# Patient Record
Sex: MALE | Race: WHITE | NOT HISPANIC OR LATINO | Employment: FULL TIME | URBAN - METROPOLITAN AREA
[De-identification: names, ages, dates, MRNs, and addresses within clinical notes are randomized per-mention and may not be internally consistent; named-entity substitution may affect disease eponyms.]

---

## 2023-03-07 LAB
ALANINE AMINOTRANSFERASE (SGPT) (U/L) IN SER/PLAS: 22 U/L (ref 10–52)
ALBUMIN (G/DL) IN SER/PLAS: 4.2 G/DL (ref 3.4–5)
ALKALINE PHOSPHATASE (U/L) IN SER/PLAS: 35 U/L (ref 33–136)
ANION GAP IN SER/PLAS: 13 MMOL/L (ref 10–20)
ASPARTATE AMINOTRANSFERASE (SGOT) (U/L) IN SER/PLAS: 23 U/L (ref 9–39)
BILIRUBIN TOTAL (MG/DL) IN SER/PLAS: 0.9 MG/DL (ref 0–1.2)
CALCIUM (MG/DL) IN SER/PLAS: 9.2 MG/DL (ref 8.6–10.6)
CARBON DIOXIDE, TOTAL (MMOL/L) IN SER/PLAS: 35 MMOL/L (ref 21–32)
CHLORIDE (MMOL/L) IN SER/PLAS: 99 MMOL/L (ref 98–107)
CHOLESTEROL (MG/DL) IN SER/PLAS: 139 MG/DL (ref 0–199)
CHOLESTEROL IN HDL (MG/DL) IN SER/PLAS: 47.6 MG/DL
CHOLESTEROL/HDL RATIO: 2.9
CREATININE (MG/DL) IN SER/PLAS: 0.88 MG/DL (ref 0.5–1.3)
ERYTHROCYTE DISTRIBUTION WIDTH (RATIO) BY AUTOMATED COUNT: 14.6 % (ref 11.5–14.5)
ERYTHROCYTE MEAN CORPUSCULAR HEMOGLOBIN CONCENTRATION (G/DL) BY AUTOMATED: 31.6 G/DL (ref 32–36)
ERYTHROCYTE MEAN CORPUSCULAR VOLUME (FL) BY AUTOMATED COUNT: 99 FL (ref 80–100)
ERYTHROCYTES (10*6/UL) IN BLOOD BY AUTOMATED COUNT: 5.65 X10E12/L (ref 4.5–5.9)
GFR MALE: >90 ML/MIN/1.73M2
GLUCOSE (MG/DL) IN SER/PLAS: 99 MG/DL (ref 74–99)
HEMATOCRIT (%) IN BLOOD BY AUTOMATED COUNT: 56 % (ref 41–52)
HEMOGLOBIN (G/DL) IN BLOOD: 17.7 G/DL (ref 13.5–17.5)
LDL: 74 MG/DL (ref 0–99)
LEUKOCYTES (10*3/UL) IN BLOOD BY AUTOMATED COUNT: 8.3 X10E9/L (ref 4.4–11.3)
NRBC (PER 100 WBCS) BY AUTOMATED COUNT: 0 /100 WBC (ref 0–0)
PLATELETS (10*3/UL) IN BLOOD AUTOMATED COUNT: 131 X10E9/L (ref 150–450)
POTASSIUM (MMOL/L) IN SER/PLAS: 4.3 MMOL/L (ref 3.5–5.3)
PROTEIN TOTAL: 6.9 G/DL (ref 6.4–8.2)
SODIUM (MMOL/L) IN SER/PLAS: 143 MMOL/L (ref 136–145)
TRIGLYCERIDE (MG/DL) IN SER/PLAS: 89 MG/DL (ref 0–149)
UREA NITROGEN (MG/DL) IN SER/PLAS: 25 MG/DL (ref 6–23)
VLDL: 18 MG/DL (ref 0–40)

## 2023-03-16 PROBLEM — I25.10 ASHD (ARTERIOSCLEROTIC HEART DISEASE): Status: ACTIVE | Noted: 2023-03-16

## 2023-03-16 PROBLEM — R51.9 HEADACHE: Status: ACTIVE | Noted: 2023-03-16

## 2023-03-16 PROBLEM — E78.5 HYPERLIPIDEMIA: Status: ACTIVE | Noted: 2023-03-16

## 2023-03-16 PROBLEM — D23.71 DERMATOFIBROMA OF RIGHT CALF: Status: ACTIVE | Noted: 2023-03-16

## 2023-03-16 PROBLEM — J30.0 VASOMOTOR RHINITIS: Status: ACTIVE | Noted: 2023-03-16

## 2023-03-16 PROBLEM — I10 BENIGN ESSENTIAL HYPERTENSION: Status: ACTIVE | Noted: 2023-03-16

## 2023-03-16 PROBLEM — R73.9 BLOOD GLUCOSE ELEVATED: Status: ACTIVE | Noted: 2023-03-16

## 2023-03-16 PROBLEM — R10.9 ABDOMINAL PAIN: Status: ACTIVE | Noted: 2023-03-16

## 2023-03-16 PROBLEM — R06.02 SHORTNESS OF BREATH: Status: ACTIVE | Noted: 2023-03-16

## 2023-03-16 PROBLEM — Z86.79 PERSONAL HISTORY OF ATRIAL FIBRILLATION: Status: ACTIVE | Noted: 2023-03-16

## 2023-03-16 PROBLEM — S46.212A TEAR OF LEFT BICEPS MUSCLE: Status: ACTIVE | Noted: 2023-03-16

## 2023-03-16 PROBLEM — R05.9 COUGH: Status: ACTIVE | Noted: 2023-03-16

## 2023-03-16 PROBLEM — R21 SKIN RASH: Status: ACTIVE | Noted: 2023-03-16

## 2023-03-16 PROBLEM — E55.9 MILD VITAMIN D DEFICIENCY: Status: ACTIVE | Noted: 2023-03-16

## 2023-03-16 PROBLEM — G47.33 OBSTRUCTIVE SLEEP APNEA, ADULT: Status: ACTIVE | Noted: 2023-03-16

## 2023-03-16 PROBLEM — I48.91 ATRIAL FIBRILLATION (MULTI): Status: ACTIVE | Noted: 2023-03-16

## 2023-03-16 PROBLEM — J45.20: Status: ACTIVE | Noted: 2023-03-16

## 2023-03-16 PROBLEM — I35.0 AORTIC STENOSIS: Status: ACTIVE | Noted: 2023-03-16

## 2023-03-16 PROBLEM — J30.1 SEASONAL ALLERGIC RHINITIS DUE TO POLLEN: Status: ACTIVE | Noted: 2023-03-16

## 2023-03-16 PROBLEM — H54.62 VISION LOSS, LEFT EYE: Status: ACTIVE | Noted: 2023-03-16

## 2023-03-16 PROBLEM — Z95.2 S/P TAVR (TRANSCATHETER AORTIC VALVE REPLACEMENT): Status: ACTIVE | Noted: 2023-03-16

## 2023-03-16 PROBLEM — R31.9 HEMATURIA: Status: ACTIVE | Noted: 2023-03-16

## 2023-03-16 PROBLEM — E66.01 MORBID OBESITY WITH BMI OF 50.0-59.9, ADULT (MULTI): Status: ACTIVE | Noted: 2023-03-16

## 2023-03-16 PROBLEM — E66.01 MORBID OBESITY (MULTI): Status: ACTIVE | Noted: 2023-03-16

## 2023-03-16 PROBLEM — R53.83 FATIGUE: Status: ACTIVE | Noted: 2023-03-16

## 2023-03-16 PROBLEM — I89.0 CHRONIC ACQUIRED LYMPHEDEMA: Status: ACTIVE | Noted: 2023-03-16

## 2023-03-16 PROBLEM — R35.0 URINE FREQUENCY: Status: ACTIVE | Noted: 2023-03-16

## 2023-03-16 PROBLEM — D75.1 POLYCYTHEMIA: Status: ACTIVE | Noted: 2023-03-16

## 2023-03-16 PROBLEM — I63.9 STROKE (MULTI): Status: ACTIVE | Noted: 2023-03-16

## 2023-03-16 PROBLEM — R60.9 DEPENDENT EDEMA: Status: ACTIVE | Noted: 2023-03-16

## 2023-03-16 RX ORDER — MULTIVITAMIN
1 TABLET ORAL DAILY
COMMUNITY
Start: 2017-06-01

## 2023-03-16 RX ORDER — ALBUTEROL SULFATE 90 UG/1
1-2 AEROSOL, METERED RESPIRATORY (INHALATION)
COMMUNITY
Start: 2021-12-13 | End: 2023-09-25 | Stop reason: ALTCHOICE

## 2023-03-16 RX ORDER — WARFARIN SODIUM 5 MG/1
TABLET ORAL
COMMUNITY
Start: 2020-02-24

## 2023-03-16 RX ORDER — CALCIUM CARBONATE 300MG(750)
TABLET,CHEWABLE ORAL
COMMUNITY
Start: 2017-06-01

## 2023-03-16 RX ORDER — GLUCOSAMINE HCL 500 MG
TABLET ORAL
COMMUNITY
Start: 2017-03-02

## 2023-03-16 RX ORDER — ROSUVASTATIN CALCIUM 20 MG/1
1 TABLET, COATED ORAL NIGHTLY
COMMUNITY
Start: 2019-06-06

## 2023-03-16 RX ORDER — ATENOLOL 25 MG/1
1 TABLET ORAL DAILY
COMMUNITY
Start: 2015-11-19

## 2023-03-16 RX ORDER — ALBUTEROL SULFATE 90 UG/1
2 AEROSOL, METERED RESPIRATORY (INHALATION) EVERY 4 HOURS PRN
COMMUNITY
Start: 2022-07-25

## 2023-03-16 RX ORDER — FUROSEMIDE 20 MG/1
1 TABLET ORAL DAILY
COMMUNITY
Start: 2022-09-27 | End: 2023-03-17 | Stop reason: SDUPTHER

## 2023-03-17 ENCOUNTER — OFFICE VISIT (OUTPATIENT)
Dept: PRIMARY CARE | Facility: CLINIC | Age: 63
End: 2023-03-17
Payer: COMMERCIAL

## 2023-03-17 VITALS
BODY MASS INDEX: 42.66 KG/M2 | SYSTOLIC BLOOD PRESSURE: 142 MMHG | WEIGHT: 315 LBS | HEIGHT: 72 IN | DIASTOLIC BLOOD PRESSURE: 76 MMHG

## 2023-03-17 DIAGNOSIS — I50.9 CONGESTIVE HEART FAILURE, UNSPECIFIED HF CHRONICITY, UNSPECIFIED HEART FAILURE TYPE (MULTI): ICD-10-CM

## 2023-03-17 DIAGNOSIS — R60.9 EDEMA, UNSPECIFIED TYPE: ICD-10-CM

## 2023-03-17 DIAGNOSIS — I10 BENIGN ESSENTIAL HYPERTENSION: ICD-10-CM

## 2023-03-17 DIAGNOSIS — E66.01 MORBID OBESITY WITH BMI OF 50.0-59.9, ADULT (MULTI): Primary | ICD-10-CM

## 2023-03-17 DIAGNOSIS — I48.11 LONGSTANDING PERSISTENT ATRIAL FIBRILLATION (MULTI): ICD-10-CM

## 2023-03-17 DIAGNOSIS — Z12.5 ENCOUNTER FOR PROSTATE CANCER SCREENING: ICD-10-CM

## 2023-03-17 DIAGNOSIS — E78.5 HYPERLIPIDEMIA, UNSPECIFIED HYPERLIPIDEMIA TYPE: ICD-10-CM

## 2023-03-17 PROCEDURE — 3077F SYST BP >= 140 MM HG: CPT | Performed by: INTERNAL MEDICINE

## 2023-03-17 PROCEDURE — 99214 OFFICE O/P EST MOD 30 MIN: CPT | Performed by: INTERNAL MEDICINE

## 2023-03-17 PROCEDURE — 3078F DIAST BP <80 MM HG: CPT | Performed by: INTERNAL MEDICINE

## 2023-03-17 PROCEDURE — 3008F BODY MASS INDEX DOCD: CPT | Performed by: INTERNAL MEDICINE

## 2023-03-17 RX ORDER — FUROSEMIDE 20 MG/1
20 TABLET ORAL DAILY
Qty: 90 TABLET | Refills: 1 | Status: SHIPPED | OUTPATIENT
Start: 2023-03-17 | End: 2023-09-26 | Stop reason: DRUGHIGH
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
## 2023-05-02 ENCOUNTER — OFFICE VISIT (OUTPATIENT)
Dept: PRIMARY CARE | Facility: CLINIC | Age: 63
End: 2023-05-02
Payer: COMMERCIAL

## 2023-05-02 VITALS
DIASTOLIC BLOOD PRESSURE: 86 MMHG | BODY MASS INDEX: 42.66 KG/M2 | WEIGHT: 315 LBS | HEIGHT: 72 IN | SYSTOLIC BLOOD PRESSURE: 144 MMHG

## 2023-05-02 DIAGNOSIS — R06.02 SHORTNESS OF BREATH: Primary | ICD-10-CM

## 2023-05-02 DIAGNOSIS — I48.0 PAROXYSMAL ATRIAL FIBRILLATION (MULTI): ICD-10-CM

## 2023-05-02 DIAGNOSIS — R05.1 ACUTE COUGH: ICD-10-CM

## 2023-05-02 DIAGNOSIS — Z86.79 PERSONAL HISTORY OF ATRIAL FIBRILLATION: ICD-10-CM

## 2023-05-02 PROBLEM — J06.9 UPPER RESPIRATORY TRACT INFECTION: Status: ACTIVE | Noted: 2020-01-06

## 2023-05-02 PROBLEM — Z95.818 PRESENCE OF WATCHMAN LEFT ATRIAL APPENDAGE CLOSURE DEVICE: Status: ACTIVE | Noted: 2020-02-11

## 2023-05-02 PROCEDURE — 93000 ELECTROCARDIOGRAM COMPLETE: CPT | Performed by: INTERNAL MEDICINE

## 2023-05-02 PROCEDURE — 3077F SYST BP >= 140 MM HG: CPT | Performed by: INTERNAL MEDICINE

## 2023-05-02 PROCEDURE — 3008F BODY MASS INDEX DOCD: CPT | Performed by: INTERNAL MEDICINE

## 2023-05-02 PROCEDURE — 3079F DIAST BP 80-89 MM HG: CPT | Performed by: INTERNAL MEDICINE

## 2023-05-02 PROCEDURE — 99214 OFFICE O/P EST MOD 30 MIN: CPT | Performed by: INTERNAL MEDICINE

## 2023-05-02 RX ORDER — AZITHROMYCIN 250 MG/1
TABLET, FILM COATED ORAL
Qty: 6 TABLET | Refills: 0 | Status: SHIPPED | OUTPATIENT
Start: 2023-05-02 | End: 2023-05-07
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
## 2023-05-04 ENCOUNTER — LAB (OUTPATIENT)
Dept: LAB | Facility: LAB | Age: 63
End: 2023-05-04
Payer: COMMERCIAL

## 2023-05-04 DIAGNOSIS — I10 BENIGN ESSENTIAL HYPERTENSION: ICD-10-CM

## 2023-05-04 DIAGNOSIS — R06.02 SHORTNESS OF BREATH: ICD-10-CM

## 2023-05-04 DIAGNOSIS — Z12.5 ENCOUNTER FOR PROSTATE CANCER SCREENING: ICD-10-CM

## 2023-05-04 DIAGNOSIS — E78.5 HYPERLIPIDEMIA, UNSPECIFIED HYPERLIPIDEMIA TYPE: ICD-10-CM

## 2023-05-04 DIAGNOSIS — I48.11 LONGSTANDING PERSISTENT ATRIAL FIBRILLATION (MULTI): ICD-10-CM

## 2023-05-04 LAB
ALANINE AMINOTRANSFERASE (SGPT) (U/L) IN SER/PLAS: 23 U/L (ref 10–52)
ALBUMIN (G/DL) IN SER/PLAS: 4.2 G/DL (ref 3.4–5)
ALKALINE PHOSPHATASE (U/L) IN SER/PLAS: 37 U/L (ref 33–136)
ANION GAP IN SER/PLAS: 8 MMOL/L (ref 10–20)
ASPARTATE AMINOTRANSFERASE (SGOT) (U/L) IN SER/PLAS: 24 U/L (ref 9–39)
BILIRUBIN TOTAL (MG/DL) IN SER/PLAS: 0.9 MG/DL (ref 0–1.2)
CALCIUM (MG/DL) IN SER/PLAS: 9.3 MG/DL (ref 8.6–10.6)
CARBON DIOXIDE, TOTAL (MMOL/L) IN SER/PLAS: 38 MMOL/L (ref 21–32)
CHLORIDE (MMOL/L) IN SER/PLAS: 101 MMOL/L (ref 98–107)
CHOLESTEROL (MG/DL) IN SER/PLAS: 118 MG/DL (ref 0–199)
CHOLESTEROL IN HDL (MG/DL) IN SER/PLAS: 48.4 MG/DL
CHOLESTEROL/HDL RATIO: 2.4
CREATININE (MG/DL) IN SER/PLAS: 1 MG/DL (ref 0.5–1.3)
ERYTHROCYTE DISTRIBUTION WIDTH (RATIO) BY AUTOMATED COUNT: 14.8 % (ref 11.5–14.5)
ERYTHROCYTE MEAN CORPUSCULAR HEMOGLOBIN CONCENTRATION (G/DL) BY AUTOMATED: 30.6 G/DL (ref 32–36)
ERYTHROCYTE MEAN CORPUSCULAR VOLUME (FL) BY AUTOMATED COUNT: 101 FL (ref 80–100)
ERYTHROCYTES (10*6/UL) IN BLOOD BY AUTOMATED COUNT: 5.62 X10E12/L (ref 4.5–5.9)
GFR MALE: 85 ML/MIN/1.73M2
GLUCOSE (MG/DL) IN SER/PLAS: 106 MG/DL (ref 74–99)
HEMATOCRIT (%) IN BLOOD BY AUTOMATED COUNT: 56.5 % (ref 41–52)
HEMOGLOBIN (G/DL) IN BLOOD: 17.3 G/DL (ref 13.5–17.5)
LDL: 50 MG/DL (ref 0–99)
LEUKOCYTES (10*3/UL) IN BLOOD BY AUTOMATED COUNT: 8.8 X10E9/L (ref 4.4–11.3)
NATRIURETIC PEPTIDE B (PG/ML) IN SER/PLAS: 64 PG/ML (ref 0–99)
NRBC (PER 100 WBCS) BY AUTOMATED COUNT: 0 /100 WBC (ref 0–0)
PLATELETS (10*3/UL) IN BLOOD AUTOMATED COUNT: 139 X10E9/L (ref 150–450)
POTASSIUM (MMOL/L) IN SER/PLAS: 4.4 MMOL/L (ref 3.5–5.3)
PROSTATE SPECIFIC AG (NG/ML) IN SER/PLAS: 3.06 NG/ML (ref 0–4)
PROTEIN TOTAL: 7 G/DL (ref 6.4–8.2)
SODIUM (MMOL/L) IN SER/PLAS: 143 MMOL/L (ref 136–145)
THYROTROPIN (MIU/L) IN SER/PLAS BY DETECTION LIMIT <= 0.05 MIU/L: 2.46 MIU/L (ref 0.44–3.98)
TRIGLYCERIDE (MG/DL) IN SER/PLAS: 98 MG/DL (ref 0–149)
UREA NITROGEN (MG/DL) IN SER/PLAS: 22 MG/DL (ref 6–23)
VLDL: 20 MG/DL (ref 0–40)

## 2023-05-04 PROCEDURE — 85027 COMPLETE CBC AUTOMATED: CPT

## 2023-05-04 PROCEDURE — 83880 ASSAY OF NATRIURETIC PEPTIDE: CPT

## 2023-05-04 PROCEDURE — 84153 ASSAY OF PSA TOTAL: CPT

## 2023-05-04 PROCEDURE — 36415 COLL VENOUS BLD VENIPUNCTURE: CPT

## 2023-05-04 PROCEDURE — 80053 COMPREHEN METABOLIC PANEL: CPT

## 2023-05-04 PROCEDURE — 84443 ASSAY THYROID STIM HORMONE: CPT

## 2023-05-04 PROCEDURE — 80061 LIPID PANEL: CPT

## 2023-05-15 ENCOUNTER — OFFICE VISIT (OUTPATIENT)
Dept: PRIMARY CARE | Facility: CLINIC | Age: 63
End: 2023-05-15
Payer: COMMERCIAL

## 2023-05-15 VITALS
HEIGHT: 73 IN | SYSTOLIC BLOOD PRESSURE: 140 MMHG | BODY MASS INDEX: 41.75 KG/M2 | WEIGHT: 315 LBS | DIASTOLIC BLOOD PRESSURE: 72 MMHG

## 2023-05-15 DIAGNOSIS — R09.81 SINUS CONGESTION: ICD-10-CM

## 2023-05-15 DIAGNOSIS — R05.9 COUGH, UNSPECIFIED TYPE: ICD-10-CM

## 2023-05-15 DIAGNOSIS — R06.02 SHORTNESS OF BREATH: Primary | ICD-10-CM

## 2023-05-15 DIAGNOSIS — G47.33 OBSTRUCTIVE SLEEP APNEA, ADULT: ICD-10-CM

## 2023-05-15 DIAGNOSIS — E66.01 MORBID OBESITY (MULTI): ICD-10-CM

## 2023-05-15 PROCEDURE — 3008F BODY MASS INDEX DOCD: CPT | Performed by: INTERNAL MEDICINE

## 2023-05-15 PROCEDURE — 3078F DIAST BP <80 MM HG: CPT | Performed by: INTERNAL MEDICINE

## 2023-05-15 PROCEDURE — 3077F SYST BP >= 140 MM HG: CPT | Performed by: INTERNAL MEDICINE

## 2023-05-15 PROCEDURE — 99214 OFFICE O/P EST MOD 30 MIN: CPT | Performed by: INTERNAL MEDICINE

## 2023-05-15 RX ORDER — FLUTICASONE PROPIONATE 50 MCG
1 SPRAY, SUSPENSION (ML) NASAL DAILY
Qty: 16 G | Refills: 11 | Status: SHIPPED | OUTPATIENT
Start: 2023-05-15 | End: 2023-09-25 | Stop reason: SDUPTHER
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
## 2023-09-25 ENCOUNTER — OFFICE VISIT (OUTPATIENT)
Dept: PRIMARY CARE | Facility: CLINIC | Age: 63
End: 2023-09-25
Payer: COMMERCIAL

## 2023-09-25 VITALS
HEART RATE: 116 BPM | DIASTOLIC BLOOD PRESSURE: 80 MMHG | BODY MASS INDEX: 41.75 KG/M2 | SYSTOLIC BLOOD PRESSURE: 136 MMHG | HEIGHT: 73 IN | WEIGHT: 315 LBS | OXYGEN SATURATION: 88 %

## 2023-09-25 DIAGNOSIS — R05.9 COUGH, UNSPECIFIED TYPE: ICD-10-CM

## 2023-09-25 PROCEDURE — 94640 AIRWAY INHALATION TREATMENT: CPT | Performed by: INTERNAL MEDICINE

## 2023-09-25 PROCEDURE — 99213 OFFICE O/P EST LOW 20 MIN: CPT | Performed by: INTERNAL MEDICINE

## 2023-09-25 PROCEDURE — 3008F BODY MASS INDEX DOCD: CPT | Performed by: INTERNAL MEDICINE

## 2023-09-25 PROCEDURE — 3079F DIAST BP 80-89 MM HG: CPT | Performed by: INTERNAL MEDICINE

## 2023-09-25 PROCEDURE — 3075F SYST BP GE 130 - 139MM HG: CPT | Performed by: INTERNAL MEDICINE

## 2023-09-25 PROCEDURE — 1036F TOBACCO NON-USER: CPT | Performed by: INTERNAL MEDICINE

## 2023-09-25 RX ORDER — METHYLPREDNISOLONE 4 MG/1
TABLET ORAL
Qty: 21 TABLET | Refills: 0 | Status: CANCELLED | OUTPATIENT
Start: 2023-09-25

## 2023-09-25 RX ORDER — FLUTICASONE PROPIONATE 50 MCG
1 SPRAY, SUSPENSION (ML) NASAL DAILY
Qty: 16 G | Refills: 0 | Status: SHIPPED | OUTPATIENT
Start: 2023-09-25 | End: 2024-09-24

## 2023-09-25 RX ORDER — ALBUTEROL SULFATE 0.63 MG/3ML
0.63 SOLUTION RESPIRATORY (INHALATION) ONCE
Status: COMPLETED | OUTPATIENT
Start: 2023-09-25 | End: 2023-09-25

## 2023-09-25 RX ORDER — AZITHROMYCIN 500 MG/1
500 TABLET, FILM COATED ORAL DAILY
Qty: 10 TABLET | Refills: 0 | Status: SHIPPED | OUTPATIENT
Start: 2023-09-25 | End: 2023-10-05

## 2023-09-25 RX ORDER — BENZONATATE 200 MG/1
200 CAPSULE ORAL 3 TIMES DAILY PRN
Qty: 42 CAPSULE | Refills: 0 | Status: SHIPPED | OUTPATIENT
Start: 2023-09-25 | End: 2023-12-04 | Stop reason: SDUPTHER

## 2023-09-25 RX ADMIN — ALBUTEROL SULFATE 0.63 MG: 0.63 SOLUTION RESPIRATORY (INHALATION) at 16:41

## 2023-09-25 ASSESSMENT — ENCOUNTER SYMPTOMS: COUGH: 1
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
## 2023-09-26 ENCOUNTER — LAB (OUTPATIENT)
Dept: LAB | Facility: LAB | Age: 63
End: 2023-09-26
Payer: COMMERCIAL

## 2023-09-26 ENCOUNTER — OFFICE VISIT (OUTPATIENT)
Dept: PRIMARY CARE | Facility: CLINIC | Age: 63
End: 2023-09-26
Payer: COMMERCIAL

## 2023-09-26 VITALS — WEIGHT: 315 LBS | BODY MASS INDEX: 41.75 KG/M2 | HEIGHT: 73 IN

## 2023-09-26 DIAGNOSIS — I10 BENIGN ESSENTIAL HYPERTENSION: ICD-10-CM

## 2023-09-26 DIAGNOSIS — I48.0 PAROXYSMAL ATRIAL FIBRILLATION (MULTI): ICD-10-CM

## 2023-09-26 DIAGNOSIS — I50.9 CONGESTIVE HEART FAILURE, UNSPECIFIED HF CHRONICITY, UNSPECIFIED HEART FAILURE TYPE (MULTI): ICD-10-CM

## 2023-09-26 DIAGNOSIS — R06.02 SHORTNESS OF BREATH: Primary | ICD-10-CM

## 2023-09-26 PROCEDURE — 83880 ASSAY OF NATRIURETIC PEPTIDE: CPT

## 2023-09-26 PROCEDURE — 93000 ELECTROCARDIOGRAM COMPLETE: CPT | Performed by: INTERNAL MEDICINE

## 2023-09-26 PROCEDURE — 1036F TOBACCO NON-USER: CPT | Performed by: INTERNAL MEDICINE

## 2023-09-26 PROCEDURE — 36415 COLL VENOUS BLD VENIPUNCTURE: CPT

## 2023-09-26 PROCEDURE — 99214 OFFICE O/P EST MOD 30 MIN: CPT | Performed by: INTERNAL MEDICINE

## 2023-09-26 PROCEDURE — 3008F BODY MASS INDEX DOCD: CPT | Performed by: INTERNAL MEDICINE

## 2023-09-26 RX ORDER — FUROSEMIDE 40 MG/1
40 TABLET ORAL 2 TIMES DAILY
Qty: 60 TABLET | Refills: 0 | Status: SHIPPED | OUTPATIENT
Start: 2023-09-26 | End: 2023-12-04 | Stop reason: SDUPTHER

## 2023-09-27 LAB — NATRIURETIC PEPTIDE B (PG/ML) IN SER/PLAS: 52 PG/ML (ref 0–99)

## 2023-10-05 PROBLEM — I50.9 CONGESTIVE HEART FAILURE (MULTI): Status: ACTIVE | Noted: 2023-10-05
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
## 2023-12-04 ENCOUNTER — OFFICE VISIT (OUTPATIENT)
Dept: PRIMARY CARE | Facility: CLINIC | Age: 63
End: 2023-12-04
Payer: COMMERCIAL

## 2023-12-04 VITALS
DIASTOLIC BLOOD PRESSURE: 74 MMHG | HEIGHT: 73 IN | BODY MASS INDEX: 41.75 KG/M2 | SYSTOLIC BLOOD PRESSURE: 126 MMHG | WEIGHT: 315 LBS

## 2023-12-04 DIAGNOSIS — R05.9 COUGH, UNSPECIFIED TYPE: ICD-10-CM

## 2023-12-04 DIAGNOSIS — I10 BENIGN ESSENTIAL HYPERTENSION: ICD-10-CM

## 2023-12-04 DIAGNOSIS — R05.1 ACUTE COUGH: Primary | ICD-10-CM

## 2023-12-04 DIAGNOSIS — E78.2 MIXED HYPERLIPIDEMIA: ICD-10-CM

## 2023-12-04 PROCEDURE — 3074F SYST BP LT 130 MM HG: CPT | Performed by: INTERNAL MEDICINE

## 2023-12-04 PROCEDURE — 99214 OFFICE O/P EST MOD 30 MIN: CPT | Performed by: INTERNAL MEDICINE

## 2023-12-04 PROCEDURE — 3078F DIAST BP <80 MM HG: CPT | Performed by: INTERNAL MEDICINE

## 2023-12-04 PROCEDURE — 1036F TOBACCO NON-USER: CPT | Performed by: INTERNAL MEDICINE

## 2023-12-04 PROCEDURE — 3008F BODY MASS INDEX DOCD: CPT | Performed by: INTERNAL MEDICINE

## 2023-12-04 RX ORDER — BENZONATATE 200 MG/1
200 CAPSULE ORAL 3 TIMES DAILY PRN
Qty: 60 CAPSULE | Refills: 0 | Status: SHIPPED | OUTPATIENT
Start: 2023-12-04 | End: 2024-06-01

## 2023-12-04 RX ORDER — AZITHROMYCIN 250 MG/1
TABLET, FILM COATED ORAL
Qty: 6 TABLET | Refills: 0 | Status: SHIPPED | OUTPATIENT
Start: 2023-12-04 | End: 2023-12-08

## 2023-12-04 RX ORDER — PANTOPRAZOLE SODIUM 40 MG/1
40 TABLET, DELAYED RELEASE ORAL 2 TIMES DAILY
Qty: 60 TABLET | Refills: 5 | Status: SHIPPED | OUTPATIENT
Start: 2023-12-04 | End: 2024-06-01

## 2023-12-04 RX ORDER — FUROSEMIDE 40 MG/1
40 TABLET ORAL 2 TIMES DAILY
Qty: 90 TABLET | Refills: 1 | Status: SHIPPED | OUTPATIENT
Start: 2023-12-04 | End: 2024-03-26

## 2023-12-04 ASSESSMENT — ENCOUNTER SYMPTOMS: COUGH: 1
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
## 2024-02-26 ENCOUNTER — APPOINTMENT (OUTPATIENT)
Dept: PRIMARY CARE | Facility: CLINIC | Age: 64
End: 2024-02-26
Payer: COMMERCIAL

## 2024-02-26 ENCOUNTER — OFFICE VISIT (OUTPATIENT)
Dept: PRIMARY CARE | Facility: CLINIC | Age: 64
End: 2024-02-26
Payer: COMMERCIAL

## 2024-02-26 VITALS
HEIGHT: 73 IN | BODY MASS INDEX: 41.75 KG/M2 | WEIGHT: 315 LBS | DIASTOLIC BLOOD PRESSURE: 82 MMHG | SYSTOLIC BLOOD PRESSURE: 140 MMHG

## 2024-02-26 DIAGNOSIS — E78.00 HIGH CHOLESTEROL: Primary | ICD-10-CM

## 2024-02-26 DIAGNOSIS — I48.21 PERMANENT ATRIAL FIBRILLATION (MULTI): ICD-10-CM

## 2024-02-26 DIAGNOSIS — G47.33 OBSTRUCTIVE SLEEP APNEA, ADULT: ICD-10-CM

## 2024-02-26 DIAGNOSIS — I50.9 CONGESTIVE HEART FAILURE, UNSPECIFIED HF CHRONICITY, UNSPECIFIED HEART FAILURE TYPE (MULTI): ICD-10-CM

## 2024-02-26 DIAGNOSIS — I10 BENIGN ESSENTIAL HYPERTENSION: ICD-10-CM

## 2024-02-26 PROCEDURE — 99213 OFFICE O/P EST LOW 20 MIN: CPT | Performed by: INTERNAL MEDICINE

## 2024-02-26 PROCEDURE — 3008F BODY MASS INDEX DOCD: CPT | Performed by: INTERNAL MEDICINE

## 2024-02-26 PROCEDURE — 1036F TOBACCO NON-USER: CPT | Performed by: INTERNAL MEDICINE

## 2024-02-26 PROCEDURE — 3079F DIAST BP 80-89 MM HG: CPT | Performed by: INTERNAL MEDICINE

## 2024-02-26 PROCEDURE — 3077F SYST BP >= 140 MM HG: CPT | Performed by: INTERNAL MEDICINE

## 2024-02-26 RX ORDER — FUROSEMIDE 40 MG/1
40 TABLET ORAL 2 TIMES DAILY
Qty: 90 TABLET | Refills: 1 | Status: CANCELLED | OUTPATIENT
Start: 2024-02-26 | End: 2024-04-26

## 2024-02-26 RX ORDER — WARFARIN SODIUM 5 MG/1
TABLET ORAL
Status: CANCELLED | OUTPATIENT
Start: 2024-02-26

## 2024-02-26 RX ORDER — PANTOPRAZOLE SODIUM 40 MG/1
40 TABLET, DELAYED RELEASE ORAL 2 TIMES DAILY
Qty: 60 TABLET | Refills: 5 | Status: CANCELLED | OUTPATIENT
Start: 2024-02-26 | End: 2024-08-24
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
## 2024-02-28 ENCOUNTER — LAB (OUTPATIENT)
Dept: LAB | Facility: LAB | Age: 64
End: 2024-02-28
Payer: COMMERCIAL

## 2024-02-28 DIAGNOSIS — E78.2 MIXED HYPERLIPIDEMIA: ICD-10-CM

## 2024-02-28 DIAGNOSIS — I10 BENIGN ESSENTIAL HYPERTENSION: ICD-10-CM

## 2024-02-28 LAB
ALBUMIN SERPL BCP-MCNC: 4.4 G/DL (ref 3.4–5)
ALP SERPL-CCNC: 33 U/L (ref 33–136)
ALT SERPL W P-5'-P-CCNC: 20 U/L (ref 10–52)
ANION GAP SERPL CALC-SCNC: 14 MMOL/L (ref 10–20)
AST SERPL W P-5'-P-CCNC: 19 U/L (ref 9–39)
BILIRUB SERPL-MCNC: 1.2 MG/DL (ref 0–1.2)
BUN SERPL-MCNC: 27 MG/DL (ref 6–23)
CALCIUM SERPL-MCNC: 9.6 MG/DL (ref 8.6–10.6)
CHLORIDE SERPL-SCNC: 98 MMOL/L (ref 98–107)
CHOLEST SERPL-MCNC: 129 MG/DL (ref 0–199)
CHOLESTEROL/HDL RATIO: 2.8
CO2 SERPL-SCNC: 33 MMOL/L (ref 21–32)
CREAT SERPL-MCNC: 1.04 MG/DL (ref 0.5–1.3)
EGFRCR SERPLBLD CKD-EPI 2021: 81 ML/MIN/1.73M*2
ERYTHROCYTE [DISTWIDTH] IN BLOOD BY AUTOMATED COUNT: 14.6 % (ref 11.5–14.5)
GLUCOSE SERPL-MCNC: 107 MG/DL (ref 74–99)
HCT VFR BLD AUTO: 58.2 % (ref 41–52)
HDLC SERPL-MCNC: 46 MG/DL
HGB BLD-MCNC: 18.2 G/DL (ref 13.5–17.5)
LDLC SERPL CALC-MCNC: 60 MG/DL
MCH RBC QN AUTO: 31.2 PG (ref 26–34)
MCHC RBC AUTO-ENTMCNC: 31.3 G/DL (ref 32–36)
MCV RBC AUTO: 100 FL (ref 80–100)
NON HDL CHOLESTEROL: 83 MG/DL (ref 0–149)
NRBC BLD-RTO: 0 /100 WBCS (ref 0–0)
PLATELET # BLD AUTO: 152 X10*3/UL (ref 150–450)
POTASSIUM SERPL-SCNC: 4.3 MMOL/L (ref 3.5–5.3)
PROT SERPL-MCNC: 7 G/DL (ref 6.4–8.2)
RBC # BLD AUTO: 5.83 X10*6/UL (ref 4.5–5.9)
SODIUM SERPL-SCNC: 141 MMOL/L (ref 136–145)
TRIGL SERPL-MCNC: 117 MG/DL (ref 0–149)
VLDL: 23 MG/DL (ref 0–40)
WBC # BLD AUTO: 8 X10*3/UL (ref 4.4–11.3)

## 2024-02-28 PROCEDURE — 80061 LIPID PANEL: CPT

## 2024-02-28 PROCEDURE — 85027 COMPLETE CBC AUTOMATED: CPT

## 2024-02-28 PROCEDURE — 80053 COMPREHEN METABOLIC PANEL: CPT

## 2024-02-28 PROCEDURE — 36415 COLL VENOUS BLD VENIPUNCTURE: CPT

## 2024-03-20 ENCOUNTER — HOSPITAL ENCOUNTER (OUTPATIENT)
Dept: CARDIOLOGY | Facility: HOSPITAL | Age: 64
Discharge: HOME | End: 2024-03-20
Payer: COMMERCIAL

## 2024-03-20 DIAGNOSIS — Z95.2 PRESENCE OF PROSTHETIC HEART VALVE: ICD-10-CM

## 2024-03-20 LAB
AORTIC VALVE MEAN GRADIENT: 7 MMHG
AORTIC VALVE PEAK VELOCITY: 2.04 M/S
AV PEAK GRADIENT: 16.6 MMHG
AVA (PEAK VEL): 0.89 CM2
AVA (VTI): 0.89 CM2
EJECTION FRACTION APICAL 4 CHAMBER: 65.7
LEFT VENTRICLE INTERNAL DIMENSION DIASTOLE: 4.64 CM (ref 3.5–6)
LEFT VENTRICULAR OUTFLOW TRACT DIAMETER: 2 CM

## 2024-03-20 PROCEDURE — 93306 TTE W/DOPPLER COMPLETE: CPT

## 2024-03-20 PROCEDURE — 93306 TTE W/DOPPLER COMPLETE: CPT | Performed by: INTERNAL MEDICINE

## 2024-03-25 NOTE — ASSESSMENT & PLAN NOTE
He had abdominal muscle aches on last visit and we stopped Rosuvastatin for 3 months as a trial.  Dr. Negro checked a lipid panel last month:  Tchol 129  HDL 46 LDL 60, good numbers.  These results ROSUVASTATIN HE WILL CONTINUE.  NO SIGNIFICANT MUSCULAR pains

## 2024-03-25 NOTE — ASSESSMENT & PLAN NOTE
Dr. Negro checked CMP last month:  K+ 4.3 Creat 1.04 (GFR 81)  Blood pressure well-controlled at this time.

## 2024-03-26 ENCOUNTER — OFFICE VISIT (OUTPATIENT)
Dept: CARDIOLOGY | Facility: CLINIC | Age: 64
End: 2024-03-26
Payer: COMMERCIAL

## 2024-03-26 VITALS
BODY MASS INDEX: 58.84 KG/M2 | DIASTOLIC BLOOD PRESSURE: 70 MMHG | SYSTOLIC BLOOD PRESSURE: 110 MMHG | WEIGHT: 315 LBS | HEART RATE: 70 BPM

## 2024-03-26 DIAGNOSIS — E78.2 MIXED HYPERLIPIDEMIA: ICD-10-CM

## 2024-03-26 DIAGNOSIS — I10 BENIGN ESSENTIAL HYPERTENSION: ICD-10-CM

## 2024-03-26 DIAGNOSIS — Z95.2 S/P TAVR (TRANSCATHETER AORTIC VALVE REPLACEMENT): Primary | ICD-10-CM

## 2024-03-26 DIAGNOSIS — I48.11 LONGSTANDING PERSISTENT ATRIAL FIBRILLATION (MULTI): ICD-10-CM

## 2024-03-26 DIAGNOSIS — Z95.818 PRESENCE OF WATCHMAN LEFT ATRIAL APPENDAGE CLOSURE DEVICE: ICD-10-CM

## 2024-03-26 DIAGNOSIS — I25.10 ASHD (ARTERIOSCLEROTIC HEART DISEASE): ICD-10-CM

## 2024-03-26 PROCEDURE — 99214 OFFICE O/P EST MOD 30 MIN: CPT | Performed by: INTERNAL MEDICINE

## 2024-03-26 PROCEDURE — 3078F DIAST BP <80 MM HG: CPT | Performed by: INTERNAL MEDICINE

## 2024-03-26 PROCEDURE — 1036F TOBACCO NON-USER: CPT | Performed by: INTERNAL MEDICINE

## 2024-03-26 PROCEDURE — 3074F SYST BP LT 130 MM HG: CPT | Performed by: INTERNAL MEDICINE

## 2024-03-26 RX ORDER — ASPIRIN 325 MG
325 TABLET ORAL DAILY
COMMUNITY

## 2024-03-26 ASSESSMENT — ENCOUNTER SYMPTOMS
HEMATURIA: 0
DEPRESSION: 0
DYSPNEA ON EXERTION: 0
PALPITATIONS: 0
LOSS OF SENSATION IN FEET: 1
NUMBNESS: 0
BLURRED VISION: 0
ABDOMINAL PAIN: 0
SHORTNESS OF BREATH: 0
DYSURIA: 0
PARESTHESIAS: 0
COUGH: 0
OCCASIONAL FEELINGS OF UNSTEADINESS: 0

## 2024-03-26 ASSESSMENT — PATIENT HEALTH QUESTIONNAIRE - PHQ9
SUM OF ALL RESPONSES TO PHQ9 QUESTIONS 1 & 2: 0
1. LITTLE INTEREST OR PLEASURE IN DOING THINGS: NOT AT ALL
SUM OF ALL RESPONSES TO PHQ9 QUESTIONS 1 & 2: 0
2. FEELING DOWN, DEPRESSED OR HOPELESS: NOT AT ALL
1. LITTLE INTEREST OR PLEASURE IN DOING THINGS: NOT AT ALL
2. FEELING DOWN, DEPRESSED OR HOPELESS: NOT AT ALL

## 2024-03-26 ASSESSMENT — PAIN SCALES - GENERAL: PAINLEVEL: 0-NO PAIN

## 2024-03-26 NOTE — PROGRESS NOTES
Subjective   Burke Talley is a 63 y.o. male.    Chief Complaint:  Follow-up (6 month follow up Afib)    HPI  Patient states that overall he feels okay with no chest pain or anginal symptoms.  He is unhappy with The weight that he has gained he does have shortness of breath with exertional activity  Review of Systems   Constitutional: Negative for malaise/fatigue.   HENT:  Negative for congestion.    Eyes:  Negative for blurred vision.   Cardiovascular:  Negative for chest pain, dyspnea on exertion and palpitations.   Respiratory:  Negative for cough and shortness of breath.    Musculoskeletal:  Negative for joint pain.   Gastrointestinal:  Negative for abdominal pain.   Genitourinary:  Negative for dysuria and hematuria.   Neurological:  Negative for numbness and paresthesias.       Objective   Constitutional:       Appearance: Not in distress.   Eyes:      Conjunctiva/sclera: Conjunctivae normal.   Neck:      Vascular: JVD normal.   Pulmonary:      Breath sounds: Normal breath sounds. No wheezing. No rhonchi. No rales.   Cardiovascular:      Normal rate. Regular rhythm.      Murmurs: There is no murmur.      No gallop.  No click. No rub.   Abdominal:      Palpations: Abdomen is soft.   Neurological:      General: No focal deficit present.      Mental Status: Alert.         Lab Review:       Assessment/Plan   The primary encounter diagnosis was S/P TAVR (transcatheter aortic valve replacement). Diagnoses of Presence of Watchman left atrial appendage closure device, Longstanding persistent atrial fibrillation (CMS/HCC), ASHD (arteriosclerotic heart disease), Benign essential hypertension, and Mixed hyperlipidemia were also pertinent to this visit.    Hyperlipidemia  He had abdominal muscle aches on last visit and we stopped Rosuvastatin for 3 months as a trial.  Dr. Negro checked a lipid panel last month:  Tchol 129  HDL 46 LDL 60, good numbers.  These results ROSUVASTATIN HE WILL CONTINUE.  NO SIGNIFICANT  MUSCULAR pains    Benign essential hypertension  Dr. Negro checked CMP last month:  K+ 4.3 Creat 1.04 (GFR 81)  Blood pressure well-controlled at this time.    ASHD (arteriosclerotic heart disease)  Stable with no chest pain.  Will concentrate on weight loss and see if that will improve his shortness of breath. GLP1 agonist may be a good option.

## 2024-03-26 NOTE — ASSESSMENT & PLAN NOTE
Stable with no chest pain.  Will concentrate on weight loss and see if that will improve his shortness of breath. GLP1 agonist may be a good option.

## 2024-05-20 DIAGNOSIS — I48.11 LONGSTANDING PERSISTENT ATRIAL FIBRILLATION (MULTI): Primary | ICD-10-CM

## 2024-05-20 RX ORDER — ATENOLOL 50 MG/1
50 TABLET ORAL 2 TIMES DAILY
Qty: 180 TABLET | Refills: 3 | Status: SHIPPED | OUTPATIENT
Start: 2024-05-20

## 2024-07-08 ENCOUNTER — APPOINTMENT (OUTPATIENT)
Dept: PRIMARY CARE | Facility: CLINIC | Age: 64
End: 2024-07-08
Payer: COMMERCIAL

## 2024-07-08 ENCOUNTER — HOSPITAL ENCOUNTER (OUTPATIENT)
Dept: RADIOLOGY | Facility: HOSPITAL | Age: 64
Discharge: HOME | End: 2024-07-08
Payer: COMMERCIAL

## 2024-07-08 VITALS
SYSTOLIC BLOOD PRESSURE: 120 MMHG | DIASTOLIC BLOOD PRESSURE: 78 MMHG | WEIGHT: 315 LBS | HEIGHT: 73 IN | BODY MASS INDEX: 41.75 KG/M2

## 2024-07-08 DIAGNOSIS — R31.9 HEMATURIA, UNSPECIFIED TYPE: ICD-10-CM

## 2024-07-08 LAB
POC APPEARANCE, URINE: CLEAR
POC BILIRUBIN, URINE: ABNORMAL
POC BLOOD, URINE: ABNORMAL
POC COLOR, URINE: ABNORMAL
POC GLUCOSE, URINE: NEGATIVE MG/DL
POC KETONES, URINE: NEGATIVE MG/DL
POC LEUKOCYTES, URINE: NEGATIVE
POC NITRITE,URINE: NEGATIVE
POC PH, URINE: 5.5 PH
POC PROTEIN, URINE: ABNORMAL MG/DL
POC SPECIFIC GRAVITY, URINE: >=1.03
POC UROBILINOGEN, URINE: 0.2 EU/DL

## 2024-07-08 PROCEDURE — 87086 URINE CULTURE/COLONY COUNT: CPT

## 2024-07-08 PROCEDURE — 74176 CT ABD & PELVIS W/O CONTRAST: CPT

## 2024-07-08 PROCEDURE — 81003 URINALYSIS AUTO W/O SCOPE: CPT | Performed by: INTERNAL MEDICINE

## 2024-07-08 PROCEDURE — 3008F BODY MASS INDEX DOCD: CPT | Performed by: INTERNAL MEDICINE

## 2024-07-08 PROCEDURE — 74176 CT ABD & PELVIS W/O CONTRAST: CPT | Performed by: RADIOLOGY

## 2024-07-08 PROCEDURE — 3078F DIAST BP <80 MM HG: CPT | Performed by: INTERNAL MEDICINE

## 2024-07-08 PROCEDURE — 3074F SYST BP LT 130 MM HG: CPT | Performed by: INTERNAL MEDICINE

## 2024-07-08 PROCEDURE — 99213 OFFICE O/P EST LOW 20 MIN: CPT | Performed by: INTERNAL MEDICINE

## 2024-07-10 LAB — BACTERIA UR CULT: NO GROWTH

## 2024-07-22 DIAGNOSIS — E78.2 MIXED HYPERLIPIDEMIA: Primary | ICD-10-CM

## 2024-07-22 RX ORDER — ROSUVASTATIN CALCIUM 20 MG/1
20 TABLET, COATED ORAL DAILY
Qty: 90 TABLET | Refills: 3 | Status: SHIPPED | OUTPATIENT
Start: 2024-07-22

## 2024-07-27 NOTE — PROGRESS NOTES
Patient is feeling little better than yesterday Subjective   Patient ID: Burke Talley is a 63 y.o. male who presents for Blood in Urine.    Patient is here with complaints of blood in the urine.  Had blood in the urine last Wednesday for 1 time.  Then had blood in the urine for last 2 days..  Denies any abdominal pain     Past recap  Patient is here for follow-up on hypertension high cholesterol CHF  Due for blood work  Needs medication refill  Not using CPAP  Overall feeling good    Past recap   patient is here with complaints of having shortness of breath coughing bringing up yellow phlegm for past 1 week.       Patient is here for follow-up  Blood work  He is feeling much better his cough is doing better his shortness of breath is doing better  Water pill and can antibiotic helped him.  He is not using his mouthpiece even for the CPAP.  Is refusing the CPAP meds  He still feels congested and then nose  He is concerned about arthritis in the hand    Patient is here with complaints of having shortness of breath he has increased swelling in the legs  Feels congested in the chest but not bringing up anything  He does not use CPAP machine uses mouthguard      Past recap  patient is here for follow-up  Follow-up on hypertension high cholesterol  Recently he is having more swelling in the legs  He had watchman's procedure for A. fib  Overall doing fine  Using mouthguard for obstructive sleep apnea, not using mouthpiece.  Did not get CPAP titration     Patient is here for follow-up  He had Covid in November he has recovered well  He had watchman's procedure for A. fib so he is not on blood thinners  He gained 10 pounds  Follow-up on hypertension high cholesterol     Patient was seen for hematuria. He had a CAT scan done which did not show any kidney stone. He did not take antibiotic. He was doing fine until this morning he noticed blood again. He was masturbating and wondering if that's causing complete. He definitely  knows the blood is coming from the penis and not from the rectum.  Patient denies any fever or chills denies any abdominal pain     Patient had surgery done status post TA VR and watchman's procedure. Had surgery on February 11, 2020  Started on 5 mg of Coumadin on Wednesday patient is also on Xarelto   Plan is to stop Xarelto once INR between 2 and 3  Patient also has tape burns on the right wrist     Patient is here with complaints of having chest condition cough wheezing as 2 weeks  He was started on Augmentin on January 2 which he finished a few days ago   On Monday 6 he went for preop clearance for heart valve surgery   He had EKG chest x-ray done   But surgery is postponed because of his breathing   Patient is still coughing and wheezing still not better  Phlegm production has improved     Patient had a cardiac catheter done through the right arm. Underwent TAVR  She had appointment with diet . still has some blurred vision on the periphery but these cleared for driving  Is going for sleep study today  He is here for follow-up on hypertension high cholesterol and CHF  Patient is taking anticoagulants     Last Friday patient woke up with headache  He was not able to see the red light and stop sign off and on  Last fasting he saw Dr. Ji his cardiologist and everything checked out good  Patient has refused to take cholesterol medication because of muscle aches  He has refused to take blood thinners because he       does not trust them  Patient is here for follow-up from hospital patient was admitted and October for partial small follow-upbowel obstruction structure treated conservatively   follow-up on hypertension COPD A. fib        Past medical history obstructive sleep apnea, appendectomy, atrophic fibrillation, hypertension, varicose vein, stasis dermatitis, left biceps tendon repair, morbid obesity     Nonsmoker social drinker     Mother had COPD father had coronary artery disease       Objective   BP  "120/78   Ht 1.854 m (6' 1\")   Wt (!) 200 kg (441 lb)   BMI 58.18 kg/m²     Physical Exam  Vitals reviewed.   Constitutional:       Appearance: Normal appearance.   HENT:      Head: Normocephalic and atraumatic.      Right Ear: Tympanic membrane, ear canal and external ear normal.      Left Ear: Tympanic membrane, ear canal and external ear normal.      Nose: Nose normal.      Mouth/Throat:      Pharynx: Oropharynx is clear.   Eyes:      Extraocular Movements: Extraocular movements intact.      Conjunctiva/sclera: Conjunctivae normal.      Pupils: Pupils are equal, round, and reactive to light.   Cardiovascular:      Rate and Rhythm: Normal rate and regular rhythm.      Pulses: Normal pulses.      Heart sounds: Normal heart sounds.   Pulmonary:      Effort: Pulmonary effort is normal.      Breath sounds: Normal breath sounds. No wheezing.   Abdominal:      General: Abdomen is flat. Bowel sounds are normal.      Palpations: Abdomen is soft.   Musculoskeletal:      Cervical back: Normal range of motion and neck supple.   Skin:     General: Skin is warm and dry.   Neurological:      General: No focal deficit present.      Mental Status: He is alert and oriented to person, place, and time.   Psychiatric:         Mood and Affect: Mood normal.         Assessment/Plan   Problem List Items Addressed This Visit             ICD-10-CM       Genitourinary and Reproductive    Hematuria R31.9    Relevant Orders    Urine Culture (Completed)    POCT UA Automated manually resulted (Completed)    CT abdomen pelvis wo IV contrast (Completed)   past recap  Patient is due for follow-up of his aortic stenosis  Ordered echocardiogram  Blood work ordered  Medications ordered  Encourage patient to lose weight diet and exercise  Be compliant with BiPAP  Follow-up after blood work     9/27/22  We will x-ray the lungs  May be the leg swelling related to CHF  Patient does follow with a cardiologist  He did echo with him  Will start Lasix 20 " mg a day  Advised to follow-up with the cardiologist  Blood work ordered  Blood pressure is okay  Patient is noncompliant with CPAP which can also lead to right-sided heart failure  Encouraged to lose weight  Low-salt diet  Keep legs elevated  If not better follow-up     12/12/22  Leg swelling doing little better  Patient not taking Lasix on regular basis  Did not do blood work yet  Explained that x-ray had showed some congestive heart failure needs to take Lasix every day needs to do blood work  Medications refilled  Follow-up in 3 months/after blood work  Patient really needs to lose weight  Discussed diet and exercise      3/17/23  Blood pressure is stable at 110/70  Blood work shows bicarb slightly elevated hemoglobin 17.7 elevated  Patient probably still has sleep apnea causing these changes  He is refusing to go for CPAP  He still appears to be slightly volume overloaded  We will check chest x-ray  Lasix refilled  Blood pressure is stable cholesterol is okay  Follow-up blood work in 3 months  Again encourage patient to lose weight  Discussed weight loss options  Patient is now interested in any options at this time he is going to work with diet and exercise follow-up in 3 months     5/3/2023  Patient is in A-fib  Heart rate is in 90s at rest  EKG shows A-fib  I am wondering if patient is having CHF  He had watchman's procedure and does not take anticoagulants  Chest x-ray ordered  Take double the dose of Lasix  Advised patient to see the cardiologist  Also recommended him to go to the emergency room as he appears to be in congestive heart failure  Patient will wait for the x-ray results and contact his cardiologist     5/15/2023  Chest x-ray showed congestive heart failure BNP 64 only LDL 50  Clinically patient is doing much better breathing wise  He still has leg swelling but is sleeping recliner  Continue maintenance Lasix  Patient is staying in A-fib.  Watchman's procedure  Advised to see the cardiologist  sooner  Again encourage patient to go for sleep doctor and adjust mask for CPAP but he is refusing but he states he will use the mouthpiece  We will try Flonase for the nasal congestion  Follow-up blood work in 3 months     9/25/2023  Breathing treatment given  We will do a stat chest x-ray  Zithromax for 10 days  Albuterol inhaler as needed  Rule out possibility of CHF   decide further after x-ray    9/26/2023  Chest x-ray shows congestive heart failure  EKG shows A-fib  BMP ordered  Explained patient that it looks like congestive heart failure he should be in the hospital but he is refusing because he feels better already  Will increase Lasix 40 mg twice daily  Follow-up in a week  Advised to also follow-up with the cardiologist      12 /4/2023  Patient is tolerating diuretics  Lasix is helping to keep the swelling down in the legs  Follow-up blood work in 3 months  Blood pressure stable  Treat with Z-Luis  Bronchitis could be possibly related to acid reflux  Try Protonix 40 mg a day  Patient advised not to snack at late hours     2/26/2024  Blood pressure is borderline high  Will order blood work CBC CMP fasting with TSH BNP to assess CHF and vitamins  Medications refilled  Follow-up blood work in 3 months  Refills given  Again encourage patient to lose weight    7/8/2024  Will send urine for culture  CT of the abdomen and pelvis to rule out kidney stone  CAT scan reviewed no kidney stone  Advised patient to follow-up with urologist for cystoscopy hematuria persists

## 2024-08-30 NOTE — ASSESSMENT & PLAN NOTE
Dr. Negro checked lipids in February: Tchol 129  HDL 46 LDL 60  Rosuvastatin has been effective in lowering LDL cholesterol.

## 2024-09-10 ENCOUNTER — OFFICE VISIT (OUTPATIENT)
Dept: CARDIOLOGY | Facility: CLINIC | Age: 64
End: 2024-09-10
Payer: COMMERCIAL

## 2024-09-10 VITALS
SYSTOLIC BLOOD PRESSURE: 114 MMHG | DIASTOLIC BLOOD PRESSURE: 80 MMHG | WEIGHT: 315 LBS | BODY MASS INDEX: 57.39 KG/M2 | HEART RATE: 102 BPM | OXYGEN SATURATION: 91 %

## 2024-09-10 DIAGNOSIS — E78.2 MIXED HYPERLIPIDEMIA: ICD-10-CM

## 2024-09-10 DIAGNOSIS — E66.01 MORBID OBESITY (MULTI): ICD-10-CM

## 2024-09-10 DIAGNOSIS — I48.11 LONGSTANDING PERSISTENT ATRIAL FIBRILLATION (MULTI): ICD-10-CM

## 2024-09-10 DIAGNOSIS — J06.9 UPPER RESPIRATORY TRACT INFECTION, UNSPECIFIED TYPE: ICD-10-CM

## 2024-09-10 DIAGNOSIS — I25.10 ASHD (ARTERIOSCLEROTIC HEART DISEASE): Primary | ICD-10-CM

## 2024-09-10 DIAGNOSIS — Z95.818 PRESENCE OF WATCHMAN LEFT ATRIAL APPENDAGE CLOSURE DEVICE: ICD-10-CM

## 2024-09-10 DIAGNOSIS — I10 BENIGN ESSENTIAL HYPERTENSION: ICD-10-CM

## 2024-09-10 DIAGNOSIS — Z95.2 S/P TAVR (TRANSCATHETER AORTIC VALVE REPLACEMENT): ICD-10-CM

## 2024-09-10 PROCEDURE — 99214 OFFICE O/P EST MOD 30 MIN: CPT | Performed by: INTERNAL MEDICINE

## 2024-09-10 PROCEDURE — 3074F SYST BP LT 130 MM HG: CPT | Performed by: INTERNAL MEDICINE

## 2024-09-10 PROCEDURE — 1036F TOBACCO NON-USER: CPT | Performed by: INTERNAL MEDICINE

## 2024-09-10 PROCEDURE — 3079F DIAST BP 80-89 MM HG: CPT | Performed by: INTERNAL MEDICINE

## 2024-09-10 RX ORDER — CEPHALEXIN 500 MG/1
500 CAPSULE ORAL 2 TIMES DAILY
Qty: 20 CAPSULE | Refills: 0 | Status: SHIPPED | OUTPATIENT
Start: 2024-09-10 | End: 2024-09-20

## 2024-09-10 ASSESSMENT — PATIENT HEALTH QUESTIONNAIRE - PHQ9
1. LITTLE INTEREST OR PLEASURE IN DOING THINGS: NOT AT ALL
2. FEELING DOWN, DEPRESSED OR HOPELESS: NOT AT ALL
SUM OF ALL RESPONSES TO PHQ9 QUESTIONS 1 AND 2: 0

## 2024-09-10 ASSESSMENT — ENCOUNTER SYMPTOMS
DYSPNEA ON EXERTION: 0
SHORTNESS OF BREATH: 1
HEMATURIA: 0
COUGH: 0
OCCASIONAL FEELINGS OF UNSTEADINESS: 0
DYSURIA: 0
PARESTHESIAS: 0
DEPRESSION: 0
PALPITATIONS: 0
LOSS OF SENSATION IN FEET: 0
ABDOMINAL PAIN: 0
BLURRED VISION: 0
NUMBNESS: 0

## 2024-09-10 ASSESSMENT — PAIN SCALES - GENERAL: PAINLEVEL: 0-NO PAIN

## 2024-09-10 NOTE — ASSESSMENT & PLAN NOTE
Discussed weight loss with the patient in detail.  He would like 6 months of diet and exercise to see what kind of progress he makes.  Referral to Dr. Papa Zhao may be a good option if he fails to significantly lose weight.

## 2024-09-10 NOTE — ASSESSMENT & PLAN NOTE
Stable with no anginal type chest pains.  Continue standard risk factor modification and follow on a clinical basis.

## 2024-09-10 NOTE — PROGRESS NOTES
Subjective   Burke Talley is a 63 y.o. male.    Chief Complaint:  6 month follow up    HPI  Patient states he had an upper respiratory infection he thinks involving his sinuses.  Drains down the back of his throat and coughs up clear mucus in the morning.  No real fevers.  He is unhappy with his weight and would like to lose some.  No anginal type chest pains.    Review of Systems   Constitutional: Negative for malaise/fatigue.   HENT:  Negative for congestion.    Eyes:  Negative for blurred vision.   Cardiovascular:  Negative for chest pain, dyspnea on exertion and palpitations.   Respiratory:  Positive for shortness of breath. Negative for cough.    Musculoskeletal:  Negative for joint pain.   Gastrointestinal:  Negative for abdominal pain.   Genitourinary:  Negative for dysuria and hematuria.   Neurological:  Negative for numbness and paresthesias.       Objective   Constitutional:       Appearance: Not in distress.   Eyes:      Conjunctiva/sclera: Conjunctivae normal.   Neck:      Vascular: JVD normal.   Pulmonary:      Breath sounds: Normal breath sounds. No wheezing. No rhonchi. No rales.   Cardiovascular:      Normal rate. Regular rhythm.      Murmurs: There is no murmur.      No gallop.  No click. No rub.   Abdominal:      Palpations: Abdomen is soft.   Neurological:      General: No focal deficit present.      Mental Status: Alert.         Lab Review:   No visits with results within 2 Month(s) from this visit.   Latest known visit with results is:   Office Visit on 07/08/2024   Component Date Value    POC Color, Urine 07/08/2024 Orange (A)     POC Appearance, Urine 07/08/2024 Clear     POC Glucose, Urine 07/08/2024 NEGATIVE     POC Bilirubin, Urine 07/08/2024 SMALL (1+) (A)     POC Ketones, Urine 07/08/2024 NEGATIVE     POC Specific Gravity, Ur* 07/08/2024 >=1.030     POC Blood, Urine 07/08/2024 TRACE-Intact (A)     POC PH, Urine 07/08/2024 5.5     POC Protein, Urine 07/08/2024 100 (2+) (A)     POC  Urobilinogen, Urine 07/08/2024 0.2     Poc Nitrite, Urine 07/08/2024 NEGATIVE     POC Leukocytes, Urine 07/08/2024 NEGATIVE     Urine Culture 07/08/2024 No growth        Assessment/Plan   The primary encounter diagnosis was ASHD (arteriosclerotic heart disease). Diagnoses of Benign essential hypertension, Mixed hyperlipidemia, S/P TAVR (transcatheter aortic valve replacement), Presence of Watchman left atrial appendage closure device, and Longstanding persistent atrial fibrillation (Multi) were also pertinent to this visit.    Hyperlipidemia  Dr. Negro checked lipids in February: Tchol 129  HDL 46 LDL 60  Rosuvastatin has been effective in lowering LDL cholesterol.    ASHD (arteriosclerotic heart disease)  Stable with no anginal type chest pains.  Continue standard risk factor modification and follow on a clinical basis.    Atrial fibrillation (Multi)  Heart rate adequately controlled on the low-dose of atenolol.  He has a Watchman left atrial appendage occlusion device for stroke prevention.    Benign essential hypertension  Blood pressure currently adequately controlled, will continue to check on a regular basis.    S/P TAVR (transcatheter aortic valve replacement)  Well compensated, prosthetic valve appeared normal on last echocardiogram.    Upper respiratory tract infection  All patient's request will prescribe Keflex 500 mg twice daily for 10 days.    Morbid obesity (Multi)  Discussed weight loss with the patient in detail.  He would like 6 months of diet and exercise to see what kind of progress he makes.  Referral to Dr. Papa Zhao may be a good option if he fails to significantly lose weight.

## 2024-09-10 NOTE — ASSESSMENT & PLAN NOTE
Heart rate adequately controlled on the low-dose of atenolol.  He has a Watchman left atrial appendage occlusion device for stroke prevention.

## 2024-09-20 ENCOUNTER — OFFICE VISIT (OUTPATIENT)
Dept: PRIMARY CARE | Facility: CLINIC | Age: 64
End: 2024-09-20
Payer: COMMERCIAL

## 2024-09-20 ENCOUNTER — HOSPITAL ENCOUNTER (OUTPATIENT)
Dept: RADIOLOGY | Facility: CLINIC | Age: 64
Discharge: HOME | End: 2024-09-20
Payer: COMMERCIAL

## 2024-09-20 ENCOUNTER — TELEPHONE (OUTPATIENT)
Dept: PRIMARY CARE | Facility: CLINIC | Age: 64
End: 2024-09-20
Payer: COMMERCIAL

## 2024-09-20 VITALS
HEIGHT: 72 IN | BODY MASS INDEX: 42.66 KG/M2 | WEIGHT: 315 LBS | DIASTOLIC BLOOD PRESSURE: 78 MMHG | SYSTOLIC BLOOD PRESSURE: 136 MMHG

## 2024-09-20 DIAGNOSIS — M79.672 LEFT FOOT PAIN: ICD-10-CM

## 2024-09-20 DIAGNOSIS — I48.11 LONGSTANDING PERSISTENT ATRIAL FIBRILLATION (MULTI): Primary | ICD-10-CM

## 2024-09-20 DIAGNOSIS — E78.2 MIXED HYPERLIPIDEMIA: ICD-10-CM

## 2024-09-20 DIAGNOSIS — H61.23 BILATERAL HEARING LOSS DUE TO CERUMEN IMPACTION: ICD-10-CM

## 2024-09-20 DIAGNOSIS — I10 BENIGN ESSENTIAL HYPERTENSION: ICD-10-CM

## 2024-09-20 DIAGNOSIS — I25.10 ASHD (ARTERIOSCLEROTIC HEART DISEASE): ICD-10-CM

## 2024-09-20 PROCEDURE — 99214 OFFICE O/P EST MOD 30 MIN: CPT | Performed by: INTERNAL MEDICINE

## 2024-09-20 PROCEDURE — 3008F BODY MASS INDEX DOCD: CPT | Performed by: INTERNAL MEDICINE

## 2024-09-20 PROCEDURE — 73630 X-RAY EXAM OF FOOT: CPT | Mod: LT

## 2024-09-20 PROCEDURE — 3075F SYST BP GE 130 - 139MM HG: CPT | Performed by: INTERNAL MEDICINE

## 2024-09-20 PROCEDURE — 3078F DIAST BP <80 MM HG: CPT | Performed by: INTERNAL MEDICINE

## 2024-09-21 ENCOUNTER — LAB (OUTPATIENT)
Dept: LAB | Facility: LAB | Age: 64
End: 2024-09-21
Payer: COMMERCIAL

## 2024-09-21 DIAGNOSIS — E78.2 MIXED HYPERLIPIDEMIA: ICD-10-CM

## 2024-09-21 DIAGNOSIS — I10 BENIGN ESSENTIAL HYPERTENSION: ICD-10-CM

## 2024-09-21 LAB
ALBUMIN SERPL BCP-MCNC: 4 G/DL (ref 3.4–5)
ALP SERPL-CCNC: 34 U/L (ref 33–136)
ALT SERPL W P-5'-P-CCNC: 15 U/L (ref 10–52)
ANION GAP SERPL CALC-SCNC: 14 MMOL/L (ref 10–20)
AST SERPL W P-5'-P-CCNC: 19 U/L (ref 9–39)
BILIRUB SERPL-MCNC: 0.9 MG/DL (ref 0–1.2)
BNP SERPL-MCNC: 136 PG/ML (ref 0–99)
BUN SERPL-MCNC: 26 MG/DL (ref 6–23)
CALCIUM SERPL-MCNC: 9.4 MG/DL (ref 8.6–10.6)
CHLORIDE SERPL-SCNC: 99 MMOL/L (ref 98–107)
CHOLEST SERPL-MCNC: 122 MG/DL (ref 0–199)
CHOLESTEROL/HDL RATIO: 3.1
CO2 SERPL-SCNC: 35 MMOL/L (ref 21–32)
CREAT SERPL-MCNC: 0.96 MG/DL (ref 0.5–1.3)
EGFRCR SERPLBLD CKD-EPI 2021: 89 ML/MIN/1.73M*2
ERYTHROCYTE [DISTWIDTH] IN BLOOD BY AUTOMATED COUNT: 15 % (ref 11.5–14.5)
GLUCOSE SERPL-MCNC: 98 MG/DL (ref 74–99)
HCT VFR BLD AUTO: 57.2 % (ref 41–52)
HDLC SERPL-MCNC: 39.7 MG/DL
HGB BLD-MCNC: 17.3 G/DL (ref 13.5–17.5)
LDLC SERPL CALC-MCNC: 64 MG/DL
MCH RBC QN AUTO: 29.7 PG (ref 26–34)
MCHC RBC AUTO-ENTMCNC: 30.2 G/DL (ref 32–36)
MCV RBC AUTO: 98 FL (ref 80–100)
NON HDL CHOLESTEROL: 82 MG/DL (ref 0–149)
NRBC BLD-RTO: 0 /100 WBCS (ref 0–0)
PLATELET # BLD AUTO: 153 X10*3/UL (ref 150–450)
POTASSIUM SERPL-SCNC: 4.6 MMOL/L (ref 3.5–5.3)
PROT SERPL-MCNC: 6.6 G/DL (ref 6.4–8.2)
RBC # BLD AUTO: 5.82 X10*6/UL (ref 4.5–5.9)
SODIUM SERPL-SCNC: 143 MMOL/L (ref 136–145)
TRIGL SERPL-MCNC: 90 MG/DL (ref 0–149)
VLDL: 18 MG/DL (ref 0–40)
WBC # BLD AUTO: 7.2 X10*3/UL (ref 4.4–11.3)

## 2024-09-21 PROCEDURE — 80053 COMPREHEN METABOLIC PANEL: CPT

## 2024-09-21 PROCEDURE — 85027 COMPLETE CBC AUTOMATED: CPT

## 2024-09-21 PROCEDURE — 36415 COLL VENOUS BLD VENIPUNCTURE: CPT

## 2024-09-21 PROCEDURE — 80061 LIPID PANEL: CPT

## 2024-09-21 PROCEDURE — 83880 ASSAY OF NATRIURETIC PEPTIDE: CPT

## 2024-10-03 NOTE — PROGRESS NOTES
Patient is feeling little better than yesterday Subjective   Patient ID: Burke Talley is a 64 y.o. male who presents for Foot Injury and Earache.    Patient presents with complaints of having left foot pain  He was wearing new golf shoes  Follow-up on hypertension high cholesterol A-fib  Due for blood work  Also having issues with hearing at some blood out of the right ear    Patient is here with complaints of blood in the urine.  Had blood in the urine last Wednesday for 1 time.  Then had blood in the urine for last 2 days..  Denies any abdominal pain     Past recap  Patient is here for follow-up on hypertension high cholesterol CHF  Due for blood work  Needs medication refill  Not using CPAP  Overall feeling good    Past recap   patient is here with complaints of having shortness of breath coughing bringing up yellow phlegm for past 1 week.       Patient is here for follow-up  Blood work  He is feeling much better his cough is doing better his shortness of breath is doing better  Water pill and can antibiotic helped him.  He is not using his mouthpiece even for the CPAP.  Is refusing the CPAP meds  He still feels congested and then nose  He is concerned about arthritis in the hand    Patient is here with complaints of having shortness of breath he has increased swelling in the legs  Feels congested in the chest but not bringing up anything  He does not use CPAP machine uses mouthguard      Past recap  patient is here for follow-up  Follow-up on hypertension high cholesterol  Recently he is having more swelling in the legs  He had watchman's procedure for A. fib  Overall doing fine  Using mouthguard for obstructive sleep apnea, not using mouthpiece.  Did not get CPAP titration     Patient is here for follow-up  He had Covid in November he has recovered well  He had watchman's procedure for A. fib so he is not on blood thinners  He gained 10 pounds  Follow-up on hypertension high cholesterol     Patient was seen  for hematuria. He had a CAT scan done which did not show any kidney stone. He did not take antibiotic. He was doing fine until this morning he noticed blood again. He was masturbating and wondering if that's causing complete. He definitely knows the blood is coming from the penis and not from the rectum.  Patient denies any fever or chills denies any abdominal pain     Patient had surgery done status post TA VR and watchman's procedure. Had surgery on February 11, 2020  Started on 5 mg of Coumadin on Wednesday patient is also on Xarelto   Plan is to stop Xarelto once INR between 2 and 3  Patient also has tape burns on the right wrist     Patient is here with complaints of having chest condition cough wheezing as 2 weeks  He was started on Augmentin on January 2 which he finished a few days ago   On Monday 6 he went for preop clearance for heart valve surgery   He had EKG chest x-ray done   But surgery is postponed because of his breathing   Patient is still coughing and wheezing still not better  Phlegm production has improved     Patient had a cardiac catheter done through the right arm. Underwent TAVR  She had appointment with diet  still has some blurred vision on the periphery but these cleared for driving  Is going for sleep study today  He is here for follow-up on hypertension high cholesterol and CHF  Patient is taking anticoagulants     Last Friday patient woke up with headache  He was not able to see the red light and stop sign off and on  Last fasting he saw Dr. Ji his cardiologist and everything checked out good  Patient has refused to take cholesterol medication because of muscle aches  He has refused to take blood thinners because he       does not trust them  Patient is here for follow-up from hospital patient was admitted and October for partial small follow-upbowel obstruction structure treated conservatively   follow-up on hypertension COPD A. fib        Past medical history obstructive sleep  apnea, appendectomy, atrophic fibrillation, hypertension, varicose vein, stasis dermatitis, left biceps tendon repair, morbid obesity     Nonsmoker social drinker     Mother had COPD father had coronary artery disease       Objective   /78   Ht 1.829 m (6')   Wt (!) 198 kg (437 lb)   BMI 59.27 kg/m²     Physical Exam  Vitals reviewed.   Constitutional:       Appearance: Normal appearance.   HENT:      Head: Normocephalic and atraumatic.      Right Ear: Tympanic membrane, ear canal and external ear normal.      Left Ear: Tympanic membrane, ear canal and external ear normal.      Nose: Nose normal.      Mouth/Throat:      Pharynx: Oropharynx is clear.   Eyes:      Extraocular Movements: Extraocular movements intact.      Conjunctiva/sclera: Conjunctivae normal.      Pupils: Pupils are equal, round, and reactive to light.   Cardiovascular:      Rate and Rhythm: Normal rate and regular rhythm.      Pulses: Normal pulses.      Heart sounds: Normal heart sounds.   Pulmonary:      Effort: Pulmonary effort is normal.      Breath sounds: Normal breath sounds. No wheezing.   Abdominal:      General: Abdomen is flat. Bowel sounds are normal.      Palpations: Abdomen is soft.   Musculoskeletal:      Cervical back: Normal range of motion and neck supple.   Skin:     General: Skin is warm and dry.   Neurological:      General: No focal deficit present.      Mental Status: He is alert and oriented to person, place, and time.   Psychiatric:         Mood and Affect: Mood normal.         Assessment/Plan   Problem List Items Addressed This Visit             ICD-10-CM       Cardiac and Vasculature    ASHD (arteriosclerotic heart disease) I25.10    Atrial fibrillation (Multi) - Primary I48.91    Benign essential hypertension I10    Relevant Orders    CBC (Completed)    Comprehensive Metabolic Panel (Completed)    Lipid Panel (Completed)    B-type natriuretic peptide (Completed)    Hyperlipidemia E78.5    Relevant Orders    CBC  (Completed)    Comprehensive Metabolic Panel (Completed)    Lipid Panel (Completed)    B-type natriuretic peptide (Completed)     Other Visit Diagnoses         Codes    Left foot pain     M79.672        past recap  Patient is due for follow-up of his aortic stenosis  Ordered echocardiogram  Blood work ordered  Medications ordered  Encourage patient to lose weight diet and exercise  Be compliant with BiPAP  Follow-up after blood work     9/27/22  We will x-ray the lungs  May be the leg swelling related to CHF  Patient does follow with a cardiologist  He did echo with him  Will start Lasix 20 mg a day  Advised to follow-up with the cardiologist  Blood work ordered  Blood pressure is okay  Patient is noncompliant with CPAP which can also lead to right-sided heart failure  Encouraged to lose weight  Low-salt diet  Keep legs elevated  If not better follow-up     12/12/22  Leg swelling doing little better  Patient not taking Lasix on regular basis  Did not do blood work yet  Explained that x-ray had showed some congestive heart failure needs to take Lasix every day needs to do blood work  Medications refilled  Follow-up in 3 months/after blood work  Patient really needs to lose weight  Discussed diet and exercise      3/17/23  Blood pressure is stable at 110/70  Blood work shows bicarb slightly elevated hemoglobin 17.7 elevated  Patient probably still has sleep apnea causing these changes  He is refusing to go for CPAP  He still appears to be slightly volume overloaded  We will check chest x-ray  Lasix refilled  Blood pressure is stable cholesterol is okay  Follow-up blood work in 3 months  Again encourage patient to lose weight  Discussed weight loss options  Patient is now interested in any options at this time he is going to work with diet and exercise follow-up in 3 months     5/3/2023  Patient is in A-fib  Heart rate is in 90s at rest  EKG shows A-fib  I am wondering if patient is having CHF  He had watchman's  procedure and does not take anticoagulants  Chest x-ray ordered  Take double the dose of Lasix  Advised patient to see the cardiologist  Also recommended him to go to the emergency room as he appears to be in congestive heart failure  Patient will wait for the x-ray results and contact his cardiologist     5/15/2023  Chest x-ray showed congestive heart failure BNP 64 only LDL 50  Clinically patient is doing much better breathing wise  He still has leg swelling but is sleeping recliner  Continue maintenance Lasix  Patient is staying in A-fib.  Watchman's procedure  Advised to see the cardiologist sooner  Again encourage patient to go for sleep doctor and adjust mask for CPAP but he is refusing but he states he will use the mouthpiece  We will try Flonase for the nasal congestion  Follow-up blood work in 3 months     9/25/2023  Breathing treatment given  We will do a stat chest x-ray  Zithromax for 10 days  Albuterol inhaler as needed  Rule out possibility of CHF   decide further after x-ray    9/26/2023  Chest x-ray shows congestive heart failure  EKG shows A-fib  BMP ordered  Explained patient that it looks like congestive heart failure he should be in the hospital but he is refusing because he feels better already  Will increase Lasix 40 mg twice daily  Follow-up in a week  Advised to also follow-up with the cardiologist      12 /4/2023  Patient is tolerating diuretics  Lasix is helping to keep the swelling down in the legs  Follow-up blood work in 3 months  Blood pressure stable  Treat with Z-Luis  Bronchitis could be possibly related to acid reflux  Try Protonix 40 mg a day  Patient advised not to snack at late hours     2/26/2024  Blood pressure is borderline high  Will order blood work CBC CMP fasting with TSH BNP to assess CHF and vitamins  Medications refilled  Follow-up blood work in 3 months  Refills given  Again encourage patient to lose weight    7/8/2024  Will send urine for culture  CT of the abdomen and  pelvis to rule out kidney stone  CAT scan reviewed no kidney stone  Advised patient to follow-up with urologist for cystoscopy hematuria persists    9/20/2024  Will x-ray left foot  Blood pressure stable  Blood work ordered  Patient has bilateral cerumen impaction  Use mineral oil and follow-up for cleaning  Since patient is on blood thinners needs to be careful

## 2024-10-10 ENCOUNTER — OFFICE VISIT (OUTPATIENT)
Dept: PRIMARY CARE | Facility: CLINIC | Age: 64
End: 2024-10-10
Payer: COMMERCIAL

## 2024-10-10 VITALS
SYSTOLIC BLOOD PRESSURE: 136 MMHG | BODY MASS INDEX: 42.66 KG/M2 | WEIGHT: 315 LBS | HEIGHT: 72 IN | DIASTOLIC BLOOD PRESSURE: 78 MMHG

## 2024-10-10 DIAGNOSIS — H61.23 BILATERAL IMPACTED CERUMEN: ICD-10-CM

## 2024-10-10 DIAGNOSIS — M79.672 LEFT FOOT PAIN: Primary | ICD-10-CM

## 2024-10-10 PROCEDURE — 3078F DIAST BP <80 MM HG: CPT | Performed by: INTERNAL MEDICINE

## 2024-10-10 PROCEDURE — 3075F SYST BP GE 130 - 139MM HG: CPT | Performed by: INTERNAL MEDICINE

## 2024-10-10 PROCEDURE — 99214 OFFICE O/P EST MOD 30 MIN: CPT | Performed by: INTERNAL MEDICINE

## 2024-10-10 PROCEDURE — 3008F BODY MASS INDEX DOCD: CPT | Performed by: INTERNAL MEDICINE

## 2024-10-15 ENCOUNTER — OFFICE VISIT (OUTPATIENT)
Dept: PRIMARY CARE | Facility: CLINIC | Age: 64
End: 2024-10-15
Payer: COMMERCIAL

## 2024-10-15 VITALS
BODY MASS INDEX: 42.66 KG/M2 | SYSTOLIC BLOOD PRESSURE: 136 MMHG | DIASTOLIC BLOOD PRESSURE: 78 MMHG | HEIGHT: 72 IN | WEIGHT: 315 LBS

## 2024-10-15 DIAGNOSIS — E78.2 MIXED HYPERLIPIDEMIA: ICD-10-CM

## 2024-10-15 DIAGNOSIS — I10 BENIGN ESSENTIAL HYPERTENSION: ICD-10-CM

## 2024-10-15 DIAGNOSIS — H61.23 BILATERAL IMPACTED CERUMEN: ICD-10-CM

## 2024-10-15 DIAGNOSIS — Z12.5 ENCOUNTER FOR PROSTATE CANCER SCREENING: ICD-10-CM

## 2024-10-15 PROCEDURE — 3075F SYST BP GE 130 - 139MM HG: CPT | Performed by: INTERNAL MEDICINE

## 2024-10-15 PROCEDURE — 3078F DIAST BP <80 MM HG: CPT | Performed by: INTERNAL MEDICINE

## 2024-10-15 PROCEDURE — 99213 OFFICE O/P EST LOW 20 MIN: CPT | Performed by: INTERNAL MEDICINE

## 2024-10-15 PROCEDURE — 3008F BODY MASS INDEX DOCD: CPT | Performed by: INTERNAL MEDICINE

## 2024-10-15 NOTE — PROGRESS NOTES
Patient is feeling little better than yesterday Subjective   Patient ID: Burke Talley is a 64 y.o. male who presents for ear lavage.    Patient is here for ear cleaning on the other ear on the right side  Saw the foot doctor he is ordering MRI of the foot  Needs blood work for the routine blood work    Past recap  Patient presents with complaints of having left foot pain  He was wearing new golf shoes  Follow-up on hypertension high cholesterol A-fib  Due for blood work  Also having issues with hearing at some blood out of the right ear    Patient is here with complaints of blood in the urine.  Had blood in the urine last Wednesday for 1 time.  Then had blood in the urine for last 2 days..  Denies any abdominal pain     Past recap  Patient is here for follow-up on hypertension high cholesterol CHF  Due for blood work  Needs medication refill  Not using CPAP  Overall feeling good    Past recap   patient is here with complaints of having shortness of breath coughing bringing up yellow phlegm for past 1 week.       Patient is here for follow-up  Blood work  He is feeling much better his cough is doing better his shortness of breath is doing better  Water pill and can antibiotic helped him.  He is not using his mouthpiece even for the CPAP.  Is refusing the CPAP meds  He still feels congested and then nose  He is concerned about arthritis in the hand    Patient is here with complaints of having shortness of breath he has increased swelling in the legs  Feels congested in the chest but not bringing up anything  He does not use CPAP machine uses mouthguard      Past recap  patient is here for follow-up  Follow-up on hypertension high cholesterol  Recently he is having more swelling in the legs  He had watchman's procedure for A. fib  Overall doing fine  Using mouthguard for obstructive sleep apnea, not using mouthpiece.  Did not get CPAP titration     Patient is here for follow-up  He had Covid in November he has  recovered well  He had watchman's procedure for A. fib so he is not on blood thinners  He gained 10 pounds  Follow-up on hypertension high cholesterol     Patient was seen for hematuria. He had a CAT scan done which did not show any kidney stone. He did not take antibiotic. He was doing fine until this morning he noticed blood again. He was masturbating and wondering if that's causing complete. He definitely knows the blood is coming from the penis and not from the rectum.  Patient denies any fever or chills denies any abdominal pain     Patient had surgery done status post TA VR and watchman's procedure. Had surgery on February 11, 2020  Started on 5 mg of Coumadin on Wednesday patient is also on Xarelto   Plan is to stop Xarelto once INR between 2 and 3  Patient also has tape burns on the right wrist     Patient is here with complaints of having chest condition cough wheezing as 2 weeks  He was started on Augmentin on January 2 which he finished a few days ago   On Monday 6 he went for preop clearance for heart valve surgery   He had EKG chest x-ray done   But surgery is postponed because of his breathing   Patient is still coughing and wheezing still not better  Phlegm production has improved     Patient had a cardiac catheter done through the right arm. Underwent TAVR  She had appointment with diet . still has some blurred vision on the periphery but these cleared for driving  Is going for sleep study today  He is here for follow-up on hypertension high cholesterol and CHF  Patient is taking anticoagulants     Last Friday patient woke up with headache  He was not able to see the red light and stop sign off and on  Last fasting he saw Dr. Ji his cardiologist and everything checked out good  Patient has refused to take cholesterol medication because of muscle aches  He has refused to take blood thinners because he       does not trust them  Patient is here for follow-up from hospital patient was admitted and  October for partial small follow-upbowel obstruction structure treated conservatively   follow-up on hypertension COPD A. fib        Past medical history obstructive sleep apnea, appendectomy, atrophic fibrillation, hypertension, varicose vein, stasis dermatitis, left biceps tendon repair, morbid obesity     Nonsmoker social drinker     Mother had COPD father had coronary artery disease       Objective   /78   Ht 1.829 m (6')   Wt (!) 198 kg (437 lb)   BMI 59.27 kg/m²     Physical Exam  Vitals reviewed.   Constitutional:       Appearance: Normal appearance.   HENT:      Head: Normocephalic and atraumatic.      Right Ear: Tympanic membrane, ear canal and external ear normal. There is no impacted cerumen.      Left Ear: Tympanic membrane, ear canal and external ear normal.      Nose: Nose normal.      Mouth/Throat:      Pharynx: Oropharynx is clear.   Eyes:      Extraocular Movements: Extraocular movements intact.      Conjunctiva/sclera: Conjunctivae normal.      Pupils: Pupils are equal, round, and reactive to light.   Cardiovascular:      Rate and Rhythm: Normal rate and regular rhythm.      Pulses: Normal pulses.      Heart sounds: Normal heart sounds.   Pulmonary:      Effort: Pulmonary effort is normal.      Breath sounds: Normal breath sounds. No wheezing.   Abdominal:      General: Abdomen is flat. Bowel sounds are normal.      Palpations: Abdomen is soft.   Musculoskeletal:      Cervical back: Normal range of motion and neck supple.   Skin:     General: Skin is warm and dry.   Neurological:      General: No focal deficit present.      Mental Status: He is alert and oriented to person, place, and time.   Psychiatric:         Mood and Affect: Mood normal.       Patient ID: Burke Talley is a 64 y.o. male.    Ear Cerumen Removal    Date/Time: 10/24/2024 12:38 AM    Performed by: Lubna Negro MD  Authorized by: Lubna Negro MD    Consent:     Consent obtained:  Verbal    Risks discussed:  Bleeding,  dizziness, incomplete removal, TM perforation, pain and infection  Universal protocol:     Patient identity confirmed:  Verbally with patient  Procedure details:     Location:  R ear    Procedure type: irrigation      Procedure outcomes: cerumen removed    Post-procedure details:     Inspection:  TM intact    Hearing quality:  Improved    Procedure completion:  Tolerated    Assessment/Plan   Problem List Items Addressed This Visit             ICD-10-CM       Cardiac and Vasculature    Benign essential hypertension I10    Relevant Orders    CBC    Comprehensive Metabolic Panel    Lipid Panel    Thyroid Stimulating Hormone    Hyperlipidemia E78.5    Relevant Orders    CBC    Comprehensive Metabolic Panel    Lipid Panel    Thyroid Stimulating Hormone     Other Visit Diagnoses         Codes    Bilateral impacted cerumen     H61.23    Relevant Orders    Ear Cerumen Removal    Encounter for prostate cancer screening     Z12.5    Relevant Orders    Prostate Specific Antigen, Screen        past recap  Patient is due for follow-up of his aortic stenosis  Ordered echocardiogram  Blood work ordered  Medications ordered  Encourage patient to lose weight diet and exercise  Be compliant with BiPAP  Follow-up after blood work     9/27/22  We will x-ray the lungs  May be the leg swelling related to CHF  Patient does follow with a cardiologist  He did echo with him  Will start Lasix 20 mg a day  Advised to follow-up with the cardiologist  Blood work ordered  Blood pressure is okay  Patient is noncompliant with CPAP which can also lead to right-sided heart failure  Encouraged to lose weight  Low-salt diet  Keep legs elevated  If not better follow-up     12/12/22  Leg swelling doing little better  Patient not taking Lasix on regular basis  Did not do blood work yet  Explained that x-ray had showed some congestive heart failure needs to take Lasix every day needs to do blood work  Medications refilled  Follow-up in 3 months/after blood  work  Patient really needs to lose weight  Discussed diet and exercise      3/17/23  Blood pressure is stable at 110/70  Blood work shows bicarb slightly elevated hemoglobin 17.7 elevated  Patient probably still has sleep apnea causing these changes  He is refusing to go for CPAP  He still appears to be slightly volume overloaded  We will check chest x-ray  Lasix refilled  Blood pressure is stable cholesterol is okay  Follow-up blood work in 3 months  Again encourage patient to lose weight  Discussed weight loss options  Patient is now interested in any options at this time he is going to work with diet and exercise follow-up in 3 months     5/3/2023  Patient is in A-fib  Heart rate is in 90s at rest  EKG shows A-fib  I am wondering if patient is having CHF  He had watchman's procedure and does not take anticoagulants  Chest x-ray ordered  Take double the dose of Lasix  Advised patient to see the cardiologist  Also recommended him to go to the emergency room as he appears to be in congestive heart failure  Patient will wait for the x-ray results and contact his cardiologist     5/15/2023  Chest x-ray showed congestive heart failure BNP 64 only LDL 50  Clinically patient is doing much better breathing wise  He still has leg swelling but is sleeping recliner  Continue maintenance Lasix  Patient is staying in A-fib.  Watchman's procedure  Advised to see the cardiologist sooner  Again encourage patient to go for sleep doctor and adjust mask for CPAP but he is refusing but he states he will use the mouthpiece  We will try Flonase for the nasal congestion  Follow-up blood work in 3 months     9/25/2023  Breathing treatment given  We will do a stat chest x-ray  Zithromax for 10 days  Albuterol inhaler as needed  Rule out possibility of CHF   decide further after x-ray    9/26/2023  Chest x-ray shows congestive heart failure  EKG shows A-fib  BMP ordered  Explained patient that it looks like congestive heart failure he  should be in the hospital but he is refusing because he feels better already  Will increase Lasix 40 mg twice daily  Follow-up in a week  Advised to also follow-up with the cardiologist      12 /4/2023  Patient is tolerating diuretics  Lasix is helping to keep the swelling down in the legs  Follow-up blood work in 3 months  Blood pressure stable  Treat with Z-Luis  Bronchitis could be possibly related to acid reflux  Try Protonix 40 mg a day  Patient advised not to snack at late hours     2/26/2024  Blood pressure is borderline high  Will order blood work CBC CMP fasting with TSH BNP to assess CHF and vitamins  Medications refilled  Follow-up blood work in 3 months  Refills given  Again encourage patient to lose weight    7/8/2024  Will send urine for culture  CT of the abdomen and pelvis to rule out kidney stone  CAT scan reviewed no kidney stone  Advised patient to follow-up with urologist for cystoscopy hematuria persists    9/20/2024  Will x-ray left foot  Blood pressure stable  Blood work ordered  Patient has bilateral cerumen impaction  Use mineral oil and follow-up for cleaning  Since patient is on blood thinners needs to be careful    12/15/2024  Syringing and curettage done in the right ear with complete removal of the wax  Patient tolerated procedure well  Hearing improved  Routine blood work ordered  Follow-up after blood

## 2024-10-24 PROCEDURE — 69209 REMOVE IMPACTED EAR WAX UNI: CPT | Performed by: INTERNAL MEDICINE

## 2024-10-24 NOTE — PROGRESS NOTES
Subjective   Patient ID: Burke Talley is a 64 y.o. male who presents for Cerumen Impaction.    Patient presents with complaints of having hearing issues related to wax buildup  Complaining of pain in the left foot not getting better      Past recap  Patient presents with complaints of having left foot pain  He was wearing new golf shoes  Follow-up on hypertension high cholesterol A-fib  Due for blood work  Also having issues with hearing at some blood out of the right ear    Patient is here with complaints of blood in the urine.  Had blood in the urine last Wednesday for 1 time.  Then had blood in the urine for last 2 days..  Denies any abdominal pain     Past recap  Patient is here for follow-up on hypertension high cholesterol CHF  Due for blood work  Needs medication refill  Not using CPAP  Overall feeling good    Past recap   patient is here with complaints of having shortness of breath coughing bringing up yellow phlegm for past 1 week.       Patient is here for follow-up  Blood work  He is feeling much better his cough is doing better his shortness of breath is doing better  Water pill and can antibiotic helped him.  He is not using his mouthpiece even for the CPAP.  Is refusing the CPAP meds  He still feels congested and then nose  He is concerned about arthritis in the hand    Patient is here with complaints of having shortness of breath he has increased swelling in the legs  Feels congested in the chest but not bringing up anything  He does not use CPAP machine uses mouthguard      Past recap  patient is here for follow-up  Follow-up on hypertension high cholesterol  Recently he is having more swelling in the legs  He had watchman's procedure for A. fib  Overall doing fineUsing mouthguard for obstructive sleep apnea, not using mouthpiece.  Did not get CPAP titration     Patient is here for follow-up  He had Covid in November he has recovered well  He had watchman's procedure for A. fib so he is not on  blood thinners  He gained 10 pounds  Follow-up on hypertension high cholesterol     Patient was seen for hematuria. He had a CAT scan done which did not show any kidney stone. He did not take antibiotic. He was doing fine until this morning he noticed blood again. He was masturbating and wondering if that's causing complete. He definitely knows the blood is coming from the penis and not from the rectum.  Patient denies any fever or chills denies any abdominal pain     Patient had surgery done status post TA VR and watchman's procedure. Had surgery on February 11, 2020  Started on 5 mg of Coumadin on Wednesday patient is also on Xarelto   Plan is to stop Xarelto once INR between 2 and 3  Patient also has tape burns on the right wrist     Patient is here with complaints of having chest condition cough wheezing as 2 weeks  He was started on Augmentin on January 2 which he finished a few days ago   On Monday 6 he went for preop clearance for heart valve surgery   He had EKG chest x-ray done   But surgery is postponed because of his breathing   Patient is still coughing and wheezing still not better  Phlegm production has improved     Patient had a cardiac catheter done through the right arm. Underwent TAVR  She had appointment with diet  still has some blurred vision on the periphery but these cleared for driving  Is going for sleep study today  He is here for follow-up on hypertension high cholesterol and CHF  Patient is taking anticoagulants     Last Friday patient woke up with headache  He was not able to see the red light and stop sign off and on  Last fasting he saw Dr. Ji his cardiologist and everything checked out good  Patient has refused to take cholesterol medication because of muscle aches  He has refused to take blood thinners because he       does not trust them  Patient is here for follow-up from hospital patient was admitted and October for partial small follow-upbowel obstruction structure treated  conservatively   follow-up on hypertension COPD A. fib        Past medical history obstructive sleep apnea, appendectomy, atrophic fibrillation, hypertension, varicose vein, stasis dermatitis, left biceps tendon repair, morbid obesity     Nonsmoker social drinker     Mother had COPD father had coronary artery disease       Objective   /78   Ht 1.829 m (6')   Wt (!) 198 kg (437 lb 1.9 oz)   BMI 59.28 kg/m²     Physical Exam  Vitals reviewed.   Constitutional:       Appearance: Normal appearance.   HENT:      Head: Normocephalic and atraumatic.      Right Ear: Tympanic membrane, ear canal and external ear normal. There is impacted cerumen.      Left Ear: Tympanic membrane, ear canal and external ear normal. There is impacted cerumen.      Nose: Nose normal.      Mouth/Throat:      Pharynx: Oropharynx is clear.   Eyes:      Extraocular Movements: Extraocular movements intact.      Conjunctiva/sclera: Conjunctivae normal.      Pupils: Pupils are equal, round, and reactive to light.   Cardiovascular:      Rate and Rhythm: Normal rate and regular rhythm.      Pulses: Normal pulses.      Heart sounds: Normal heart sounds.   Pulmonary:      Effort: Pulmonary effort is normal.      Breath sounds: Normal breath sounds. No wheezing.   Abdominal:      General: Abdomen is flat. Bowel sounds are normal.      Palpations: Abdomen is soft.   Musculoskeletal:         General: Swelling and tenderness present.      Cervical back: Normal range of motion and neck supple.      Comments: Left foot outer side swelling and tenderness   Skin:     General: Skin is warm and dry.   Neurological:      General: No focal deficit present.      Mental Status: He is alert and oriented to person, place, and time.   Psychiatric:         Mood and Affect: Mood normal.     Patient ID: Burke Talley is a 64 y.o. male.    Ear Cerumen Removal    Date/Time: 10/24/2024 12:35 AM    Performed by: Lubna Negro MD  Authorized by: Lubna Negro MD     Consent:     Consent obtained:  Verbal    Consent given by:  Patient    Risks discussed:  Bleeding, dizziness, incomplete removal, TM perforation, pain and infection  Universal protocol:     Patient identity confirmed:  Verbally with patient  Procedure details:     Location:  R ear and L ear    Procedure type: irrigation      Procedure outcomes: cerumen removed    Post-procedure details:     Hearing quality:  Improved    Procedure completion:  Tolerated      Assessment/Plan   Problem List Items Addressed This Visit    None  Visit Diagnoses         Codes    Left foot pain    -  Primary M79.672    Bilateral impacted cerumen     H61.23    Relevant Orders    Ear Cerumen Removal        past recap  Patient is due for follow-up of his aortic stenosis  Ordered echocardiogram  Blood work ordered  Medications ordered  Encourage patient to lose weight diet and exercise  Be compliant with BiPAP  Follow-up after blood work     9/27/22  We will x-ray the lungs  May be the leg swelling related to CHF  Patient does follow with a cardiologist  He did echo with him  Will start Lasix 20 mg a day  Advised to follow-up with the cardiologist  Blood work ordered  Blood pressure is okay  Patient is noncompliant with CPAP which can also lead to right-sided heart failure  Encouraged to lose weight  Low-salt diet  Keep legs elevated  If not better follow-up     12/12/22  Leg swelling doing little better  Patient not taking Lasix on regular basis  Did not do blood work yet  Explained that x-ray had showed some congestive heart failure needs to take Lasix every day needs to do blood work  Medications refilled  Follow-up in 3 months/after blood work  Patient really needs to lose weight  Discussed diet and exercise      3/17/23  Blood pressure is stable at 110/70  Blood work shows bicarb slightly elevated hemoglobin 17.7 elevated  Patient probably still has sleep apnea causing these changes  He is refusing to go for CPAP  He still appears to be  slightly volume overloaded  We will check chest x-ray  Lasix refilled  Blood pressure is stable cholesterol is okay  Follow-up blood work in 3 months  Again encourage patient to lose weight  Discussed weight loss options  Patient is now interested in any options at this time he is going to work with diet and exercise follow-up in 3 months     5/3/2023  Patient is in A-fib  Heart rate is in 90s at rest  EKG shows A-fib  I am wondering if patient is having CHF  He had watchman's procedure and does not take anticoagulants  Chest x-ray ordered  Take double the dose of Lasix  Advised patient to see the cardiologist  Also recommended him to go to the emergency room as he appears to be in congestive heart failure  Patient will wait for the x-ray results and contact his cardiologist     5/15/2023  Chest x-ray showed congestive heart failure BNP 64 only LDL 50  Clinically patient is doing much better breathing wise  He still has leg swelling but is sleeping recliner  Continue maintenance Lasix  Patient is staying in A-fib.  Watchman's procedure  Advised to see the cardiologist sooner  Again encourage patient to go for sleep doctor and adjust mask for CPAP but he is refusing but he states he will use the mouthpiece  We will try Flonase for the nasal congestion  Follow-up blood work in 3 months     9/25/2023  Breathing treatment given  We will do a stat chest x-ray  Zithromax for 10 days  Albuterol inhaler as needed  Rule out possibility of CHF   decide further after x-ray    9/26/2023  Chest x-ray shows congestive heart failure  EKG shows A-fib  BMP ordered  Explained patient that it looks like congestive heart failure he should be in the hospital but he is refusing because he feels better already  Will increase Lasix 40 mg twice daily  Follow-up in a week  Advised to also follow-up with the cardiologist      12 /4/2023  Patient is tolerating diuretics  Lasix is helping to keep the swelling down in the legs  Follow-up blood  work in 3 months  Blood pressure stable  Treat with Z-Luis  Bronchitis could be possibly related to acid reflux  Try Protonix 40 mg a day  Patient advised not to snack at late hours     2/26/2024  Blood pressure is borderline high  Will order blood work CBC CMP fasting with TSH BNP to assess CHF and vitamins  Medications refilled  Follow-up blood work in 3 months  Refills given  Again encourage patient to lose weight    7/8/2024  Will send urine for culture  CT of the abdomen and pelvis to rule out kidney stone  CAT scan reviewed no kidney stone  Advised patient to follow-up with urologist for cystoscopy hematuria persists    9/20/2024  Will x-ray left foot  Blood pressure stable  Blood work ordered  Patient has bilateral cerumen impaction  Use mineral oil and follow-up for cleaning  Since patient is on blood thinners needs to be careful    10/10/2024  Syringing and curettage done at earwax removed completely from the left ear  Right ear attempted but very dry and advised patient to use mineral oil and follow-up  Pain could be gout could be sprain  Patient referred to podiatrist

## 2024-12-04 DIAGNOSIS — I48.11 LONGSTANDING PERSISTENT ATRIAL FIBRILLATION (MULTI): Primary | ICD-10-CM

## 2024-12-04 RX ORDER — ATENOLOL 25 MG/1
25 TABLET ORAL DAILY
Qty: 90 TABLET | Refills: 3 | Status: SHIPPED | OUTPATIENT
Start: 2024-12-04

## 2024-12-04 NOTE — TELEPHONE ENCOUNTER
Patient calling not sure what dose you would like him to be on for his atenolol and he needs a refill he only has 2 pills left and would a refill sent to his local pharmacy please advise

## 2024-12-27 DIAGNOSIS — I48.11 LONGSTANDING PERSISTENT ATRIAL FIBRILLATION (MULTI): ICD-10-CM

## 2024-12-27 RX ORDER — ATENOLOL 25 MG/1
25 TABLET ORAL DAILY
Qty: 90 TABLET | Refills: 3 | Status: SHIPPED | OUTPATIENT
Start: 2024-12-27

## 2025-01-17 DIAGNOSIS — R05.9 COUGH, UNSPECIFIED TYPE: ICD-10-CM

## 2025-01-17 RX ORDER — PANTOPRAZOLE SODIUM 40 MG/1
40 TABLET, DELAYED RELEASE ORAL 2 TIMES DAILY
Qty: 60 TABLET | Refills: 0 | Status: SHIPPED | OUTPATIENT
Start: 2025-01-17 | End: 2025-07-16

## 2025-03-10 ENCOUNTER — APPOINTMENT (OUTPATIENT)
Dept: PRIMARY CARE | Facility: CLINIC | Age: 65
End: 2025-03-10
Payer: COMMERCIAL

## 2025-03-10 ENCOUNTER — TELEPHONE (OUTPATIENT)
Dept: PRIMARY CARE | Facility: CLINIC | Age: 65
End: 2025-03-10

## 2025-03-19 NOTE — ASSESSMENT & PLAN NOTE
Dr. Negro checked lipids in Sept. 2024: Tchol 122 TG 90 HDL 39 LDL 64  Will check fasting lipid panel on return visit.

## 2025-03-19 NOTE — ASSESSMENT & PLAN NOTE
Dr. Negro checked CMP last September: Na+ 143 K+ 4.6 Creat 0.96 GFR 89  Blood pressure is adequately controlled on today's exam.

## 2025-03-25 DIAGNOSIS — R05.9 COUGH, UNSPECIFIED TYPE: ICD-10-CM

## 2025-03-25 RX ORDER — PANTOPRAZOLE SODIUM 40 MG/1
40 TABLET, DELAYED RELEASE ORAL 2 TIMES DAILY
Qty: 60 TABLET | Refills: 0 | Status: SHIPPED | OUTPATIENT
Start: 2025-03-25

## 2025-03-31 ENCOUNTER — OFFICE VISIT (OUTPATIENT)
Facility: CLINIC | Age: 65
End: 2025-03-31
Payer: COMMERCIAL

## 2025-03-31 VITALS
WEIGHT: 315 LBS | HEART RATE: 104 BPM | OXYGEN SATURATION: 90 % | SYSTOLIC BLOOD PRESSURE: 120 MMHG | DIASTOLIC BLOOD PRESSURE: 80 MMHG | BODY MASS INDEX: 60.62 KG/M2

## 2025-03-31 DIAGNOSIS — Z95.818 PRESENCE OF WATCHMAN LEFT ATRIAL APPENDAGE CLOSURE DEVICE: ICD-10-CM

## 2025-03-31 DIAGNOSIS — I25.10 ASHD (ARTERIOSCLEROTIC HEART DISEASE): Primary | ICD-10-CM

## 2025-03-31 DIAGNOSIS — I48.11 LONGSTANDING PERSISTENT ATRIAL FIBRILLATION (MULTI): ICD-10-CM

## 2025-03-31 DIAGNOSIS — I10 BENIGN ESSENTIAL HYPERTENSION: ICD-10-CM

## 2025-03-31 DIAGNOSIS — E78.2 MIXED HYPERLIPIDEMIA: ICD-10-CM

## 2025-03-31 DIAGNOSIS — M19.90 ARTHRITIS: ICD-10-CM

## 2025-03-31 DIAGNOSIS — Z95.2 S/P TAVR (TRANSCATHETER AORTIC VALVE REPLACEMENT): ICD-10-CM

## 2025-03-31 PROCEDURE — 3074F SYST BP LT 130 MM HG: CPT | Performed by: INTERNAL MEDICINE

## 2025-03-31 PROCEDURE — 1036F TOBACCO NON-USER: CPT | Performed by: INTERNAL MEDICINE

## 2025-03-31 PROCEDURE — 3079F DIAST BP 80-89 MM HG: CPT | Performed by: INTERNAL MEDICINE

## 2025-03-31 PROCEDURE — 99214 OFFICE O/P EST MOD 30 MIN: CPT | Performed by: INTERNAL MEDICINE

## 2025-03-31 RX ORDER — IBUPROFEN 800 MG/1
800 TABLET ORAL EVERY 8 HOURS PRN
Qty: 30 TABLET | Refills: 3 | Status: SHIPPED | OUTPATIENT
Start: 2025-03-31 | End: 2025-05-10

## 2025-03-31 ASSESSMENT — ENCOUNTER SYMPTOMS
PARESTHESIAS: 0
DYSPNEA ON EXERTION: 0
DEPRESSION: 0
BLURRED VISION: 0
BLOATING: 1
OCCASIONAL FEELINGS OF UNSTEADINESS: 1
LOSS OF SENSATION IN FEET: 1
SHORTNESS OF BREATH: 0
DYSURIA: 0
ABDOMINAL PAIN: 1
NUMBNESS: 0
HEMATURIA: 0
PALPITATIONS: 0
COUGH: 0

## 2025-03-31 ASSESSMENT — PAIN SCALES - GENERAL: PAINLEVEL_OUTOF10: 5

## 2025-03-31 ASSESSMENT — LIFESTYLE VARIABLES: TOTAL SCORE: 0

## 2025-03-31 NOTE — ASSESSMENT & PLAN NOTE
Heart rate adequately controlled on simple atenolol therapy.  Watchman left atrial appendage occlusion device deployed for stroke prevention.

## 2025-03-31 NOTE — PROGRESS NOTES
Subjective   Burke Talley is a 64 y.o. male.    Chief Complaint:  6 month f/u    HPI  Patient states no chest pain or palpitations.  Does have some epigastric discomfort at time and he is very concerned that his weight continues to elevate he would like to lose weight.  Also has joint pain mostly affecting his knee.    Review of Systems   Constitutional: Negative for malaise/fatigue.   HENT:  Negative for congestion.    Eyes:  Negative for blurred vision.   Cardiovascular:  Negative for chest pain, dyspnea on exertion and palpitations.   Respiratory:  Negative for cough and shortness of breath.    Musculoskeletal:  Positive for joint pain.   Gastrointestinal:  Positive for bloating and abdominal pain.   Genitourinary:  Negative for dysuria and hematuria.   Neurological:  Negative for numbness and paresthesias.       Objective   Constitutional:       Appearance: Not in distress.   Eyes:      Conjunctiva/sclera: Conjunctivae normal.   Neck:      Vascular: JVD normal.   Pulmonary:      Breath sounds: Normal breath sounds. No wheezing. No rhonchi. No rales.   Cardiovascular:      Normal rate. Irregularly irregular rhythm.      Murmurs: There is no murmur.      No gallop.  No click. No rub.   Edema:     Pretibial: bilateral 1+ edema of the pretibial area.     Ankle: bilateral 1+ edema of the ankle.     Feet: bilateral 1+ edema of the feet.  Abdominal:      Palpations: Abdomen is soft.   Neurological:      General: No focal deficit present.      Mental Status: Alert.         Lab Review:       Assessment/Plan   The primary encounter diagnosis was ASHD (arteriosclerotic heart disease). Diagnoses of Longstanding persistent atrial fibrillation (Multi), Benign essential hypertension, Mixed hyperlipidemia, Presence of Watchman left atrial appendage closure device, and S/P TAVR (transcatheter aortic valve replacement) were also pertinent to this visit.    Hyperlipidemia  Dr. Negro checked lipids in Sept. 2024: Tchol 122 TG 90  HDL 39 LDL 64  Will check fasting lipid panel on return visit.    Benign essential hypertension  Dr. Negro checked CMP last September: Na+ 143 K+ 4.6 Creat 0.96 GFR 89  Blood pressure is adequately controlled on today's exam.    ASHD (arteriosclerotic heart disease)  No classic anginal symptoms.  He had nonobstructive coronary disease on his cardiac catheterization in 2019.  We will continue standard risk factor modification and follow on a clinical basis.    Atrial fibrillation (Multi)  Heart rate adequately controlled on simple atenolol therapy.  Watchman left atrial appendage occlusion device deployed for stroke prevention.    Presence of Watchman left atrial appendage closure device  Watchman left atrial appendage occlusion device for stroke prevention.    S/P TAVR (transcatheter aortic valve replacement)  Will plan on rechecking echocardiogram approximately March 2026.

## 2025-03-31 NOTE — ASSESSMENT & PLAN NOTE
No classic anginal symptoms.  He had nonobstructive coronary disease on his cardiac catheterization in 2019.  We will continue standard risk factor modification and follow on a clinical basis.

## 2025-05-19 ENCOUNTER — OFFICE VISIT (OUTPATIENT)
Dept: PRIMARY CARE | Facility: CLINIC | Age: 65
End: 2025-05-19
Payer: COMMERCIAL

## 2025-05-19 VITALS
WEIGHT: 315 LBS | BODY MASS INDEX: 42.66 KG/M2 | SYSTOLIC BLOOD PRESSURE: 152 MMHG | HEIGHT: 72 IN | DIASTOLIC BLOOD PRESSURE: 88 MMHG

## 2025-05-19 DIAGNOSIS — R73.9 ELEVATED BLOOD SUGAR: Primary | ICD-10-CM

## 2025-05-19 DIAGNOSIS — L03.116 CELLULITIS OF LEFT LOWER EXTREMITY: ICD-10-CM

## 2025-05-19 DIAGNOSIS — E78.2 MIXED HYPERLIPIDEMIA: ICD-10-CM

## 2025-05-19 DIAGNOSIS — R05.9 COUGH, UNSPECIFIED TYPE: ICD-10-CM

## 2025-05-19 DIAGNOSIS — R06.83 SNORING: ICD-10-CM

## 2025-05-19 DIAGNOSIS — Z00.00 PHYSICAL EXAM: ICD-10-CM

## 2025-05-19 DIAGNOSIS — I10 BENIGN ESSENTIAL HYPERTENSION: ICD-10-CM

## 2025-05-19 PROCEDURE — 3079F DIAST BP 80-89 MM HG: CPT | Performed by: INTERNAL MEDICINE

## 2025-05-19 PROCEDURE — 3008F BODY MASS INDEX DOCD: CPT | Performed by: INTERNAL MEDICINE

## 2025-05-19 PROCEDURE — 3077F SYST BP >= 140 MM HG: CPT | Performed by: INTERNAL MEDICINE

## 2025-05-19 PROCEDURE — 99214 OFFICE O/P EST MOD 30 MIN: CPT | Performed by: INTERNAL MEDICINE

## 2025-05-19 RX ORDER — CEPHALEXIN 500 MG/1
500 CAPSULE ORAL 3 TIMES DAILY
Qty: 30 CAPSULE | Refills: 0 | Status: SHIPPED | OUTPATIENT
Start: 2025-05-19 | End: 2025-05-29

## 2025-05-19 RX ORDER — PANTOPRAZOLE SODIUM 40 MG/1
40 TABLET, DELAYED RELEASE ORAL 2 TIMES DAILY
Qty: 60 TABLET | Refills: 2 | Status: SHIPPED | OUTPATIENT
Start: 2025-05-19
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
## 2025-05-21 LAB
ALBUMIN SERPL-MCNC: 3.9 G/DL (ref 3.6–5.1)
ALP SERPL-CCNC: 33 U/L (ref 35–144)
ALT SERPL-CCNC: 18 U/L (ref 9–46)
ANION GAP SERPL CALCULATED.4IONS-SCNC: 11 MMOL/L (CALC) (ref 7–17)
AST SERPL-CCNC: 27 U/L (ref 10–35)
BILIRUB SERPL-MCNC: 1.3 MG/DL (ref 0.2–1.2)
BUN SERPL-MCNC: 32 MG/DL (ref 7–25)
CALCIUM SERPL-MCNC: 8.8 MG/DL (ref 8.6–10.3)
CHLORIDE SERPL-SCNC: 99 MMOL/L (ref 98–110)
CHOLEST SERPL-MCNC: 108 MG/DL
CHOLEST/HDLC SERPL: 2.8 (CALC)
CO2 SERPL-SCNC: 33 MMOL/L (ref 20–32)
CREAT SERPL-MCNC: 1.26 MG/DL (ref 0.7–1.35)
EGFRCR SERPLBLD CKD-EPI 2021: 64 ML/MIN/1.73M2
ERYTHROCYTE [DISTWIDTH] IN BLOOD BY AUTOMATED COUNT: 13.5 % (ref 11–15)
EST. AVERAGE GLUCOSE BLD GHB EST-MCNC: 154 MG/DL
EST. AVERAGE GLUCOSE BLD GHB EST-SCNC: 8.5 MMOL/L
GLUCOSE SERPL-MCNC: 113 MG/DL (ref 65–99)
HBA1C MFR BLD: 7 %
HCT VFR BLD AUTO: 53.1 % (ref 38.5–50)
HDLC SERPL-MCNC: 38 MG/DL
HGB BLD-MCNC: 16.2 G/DL (ref 13.2–17.1)
LDLC SERPL CALC-MCNC: 53 MG/DL (CALC)
MCH RBC QN AUTO: 29.7 PG (ref 27–33)
MCHC RBC AUTO-ENTMCNC: 30.5 G/DL (ref 32–36)
MCV RBC AUTO: 97.3 FL (ref 80–100)
NONHDLC SERPL-MCNC: 70 MG/DL (CALC)
PLATELET # BLD AUTO: 134 THOUSAND/UL (ref 140–400)
PMV BLD REES-ECKER: 11.9 FL (ref 7.5–12.5)
POTASSIUM SERPL-SCNC: 4.6 MMOL/L (ref 3.5–5.3)
PROT SERPL-MCNC: 6.4 G/DL (ref 6.1–8.1)
PSA SERPL-MCNC: 11.09 NG/ML
RBC # BLD AUTO: 5.46 MILLION/UL (ref 4.2–5.8)
SODIUM SERPL-SCNC: 143 MMOL/L (ref 135–146)
TRIGL SERPL-MCNC: 88 MG/DL
TSH SERPL-ACNC: 2.27 MIU/L (ref 0.4–4.5)
WBC # BLD AUTO: 9 THOUSAND/UL (ref 3.8–10.8)

## 2025-05-22 ENCOUNTER — OFFICE VISIT (OUTPATIENT)
Dept: PRIMARY CARE | Facility: CLINIC | Age: 65
End: 2025-05-22
Payer: COMMERCIAL

## 2025-05-22 VITALS
BODY MASS INDEX: 42.66 KG/M2 | DIASTOLIC BLOOD PRESSURE: 84 MMHG | SYSTOLIC BLOOD PRESSURE: 144 MMHG | WEIGHT: 315 LBS | HEIGHT: 72 IN

## 2025-05-22 DIAGNOSIS — E11.9 TYPE 2 DIABETES MELLITUS WITHOUT COMPLICATION, UNSPECIFIED WHETHER LONG TERM INSULIN USE: ICD-10-CM

## 2025-05-22 DIAGNOSIS — E78.2 MIXED HYPERLIPIDEMIA: ICD-10-CM

## 2025-05-22 DIAGNOSIS — I10 BENIGN ESSENTIAL HYPERTENSION: ICD-10-CM

## 2025-05-22 DIAGNOSIS — R97.20 ELEVATED PSA: ICD-10-CM

## 2025-05-22 PROCEDURE — 3079F DIAST BP 80-89 MM HG: CPT | Performed by: INTERNAL MEDICINE

## 2025-05-22 PROCEDURE — 3077F SYST BP >= 140 MM HG: CPT | Performed by: INTERNAL MEDICINE

## 2025-05-22 PROCEDURE — 99214 OFFICE O/P EST MOD 30 MIN: CPT | Performed by: INTERNAL MEDICINE

## 2025-05-22 PROCEDURE — 3008F BODY MASS INDEX DOCD: CPT | Performed by: INTERNAL MEDICINE

## 2025-05-22 RX ORDER — DEXTROSE 4 G
TABLET,CHEWABLE ORAL
Qty: 1 EACH | Refills: 0 | Status: SHIPPED | OUTPATIENT
Start: 2025-05-22

## 2025-05-22 RX ORDER — IBUPROFEN 200 MG
1 CAPSULE ORAL 2 TIMES DAILY
Qty: 100 STRIP | Refills: 2 | Status: SHIPPED | OUTPATIENT
Start: 2025-05-22

## 2025-05-22 RX ORDER — LANCETS
EACH MISCELLANEOUS
Qty: 100 EACH | Refills: 3 | Status: SHIPPED | OUTPATIENT
Start: 2025-05-22

## 2025-05-22 RX ORDER — METFORMIN HYDROCHLORIDE 1000 MG/1
1000 TABLET ORAL
Qty: 180 TABLET | Refills: 0 | Status: SHIPPED | OUTPATIENT
Start: 2025-05-22 | End: 2026-06-26

## 2025-05-27 ENCOUNTER — TELEPHONE (OUTPATIENT)
Facility: CLINIC | Age: 65
End: 2025-05-27

## 2025-05-27 NOTE — TELEPHONE ENCOUNTER
Called patient and spoke with him on the phone.  He had an episode of near syncope about a week ago.  No recurrent episodes.  Has had some increased shortness of breath and swelling in the legs.  He was taking furosemide only as needed.  I instructed him to take the furosemide twice daily for the next week and then cut it back to once daily if he has had good improvement.  If symptoms do not improve or if he has any recurrent near syncope that he needs to report to the emergency department for an assessment and evaluation.

## 2025-05-27 NOTE — TELEPHONE ENCOUNTER
Pt called the office stated he is not feeling well  Pt would also like for you to give him a call back to discuss his recent lab report   Please advice   Good call back number  [phone]

## 2025-05-31 NOTE — PROGRESS NOTES
Subjective   Patient ID: Burke Talley is a 64 y.o. male who presents for Follow-up.    Patient is here for follow-up on blood work  Swelling in the knee is coming down  Cellulitis left leg is doing better     Past recap  Patient is here for follow-up  He fell on the left knee and had bruising on the left knee now feeling little warm and red  He was at [employer] playing cards, drinking and eating, went to the bathroom and fell.  Denies syncopal episode but more of balance issue  Taking ashwagandha Gummies  Not using CPAP  Due for blood work for blood pressure and cholesterol    Past recap  Patient is here for ear cleaning on the other ear on the right side  Saw the foot doctor he is ordering MRI of the foot  Needs blood work for the routine blood work    Past recap  Patient presents with complaints of having left foot pain  He was wearing new golf shoes  Follow-up on hypertension high cholesterol A-fib  Due for blood work  Also having issues with hearing at some blood out of the right ear    Patient is here with complaints of blood in the urine.  Had blood in the urine last Wednesday for 1 time.  Then had blood in the urine for last 2 days..  Denies any abdominal pain     Past recap  Patient is here for follow-up on hypertension high cholesterol CHF  Due for blood work  Needs medication refill  Not using CPAP  Overall feeling good    Past recap   patient is here with complaints of having shortness of breath coughing bringing up yellow phlegm for past 1 week.       Patient is here for follow-up  Blood work  He is feeling much better his cough is doing better his shortness of breath is doing better  Water pill and can antibiotic helped him.  He is not using his mouthpiece even for the CPAP.  Is refusing the CPAP meds  He still feels congested and then nose  He is concerned about arthritis in the hand    Patient is here with complaints of having shortness of breath he has increased swelling in the legs  Feels  congested in the chest but not bringing up anything  He does not use CPAP machine uses mouthguard      Past recap  patient is here for follow-up  Follow-up on hypertension high cholesterol  Recently he is having more swelling in the legs  He had watchman's procedure for A. fib  Overall doing fine  Using mouthguard for obstructive sleep apnea, not using mouthpiece.  Did not get CPAP titration     Patient is here for follow-up  He had Covid in November he has recovered well  He had watchman's procedure for A. fib so he is not on blood thinners  He gained 10 pounds  Follow-up on hypertension high cholesterol     Patient was seen for hematuria. He had a CAT scan done which did not show any kidney stone. He did not take antibiotic. He was doing fine until this morning he noticed blood again. He was masturbating and wondering if that's causing complete. He definitely knows the blood is coming from the penis and not from the rectum.  Patient denies any fever or chills denies any abdominal pain     Patient had surgery done status post TA VR and watchman's procedure. Had surgery on February 11, 2020  Started on 5 mg of Coumadin on Wednesday patient is also on Xarelto   Plan is to stop Xarelto once INR between 2 and 3  Patient also has tape burns on the right wrist     Patient is here with complaints of having chest condition cough wheezing as 2 weeks  He was started on Augmentin on January 2 which he finished a few days ago   On Monday 6 he went for preop clearance for heart valve surgery   He had EKG chest x-ray done   But surgery is postponed because of his breathing   Patient is still coughing and wheezing still not better  Phlegm production has improved     Patient had a cardiac catheter done through the right arm. Underwent TAVR  She had appointment with diet  still has some blurred vision on the periphery but these cleared for driving  Is going for sleep study today  He is here for follow-up on hypertension high  cholesterol and CHF  Patient is taking anticoagulants     Last Friday patient woke up with headache  He was not able to see the red light and stop sign off and on  Last fasting he saw Dr. Ji his cardiologist and everything checked out good  Patient has refused to take cholesterol medication because of muscle aches  He has refused to take blood thinners because he       does not trust them  Patient is here for follow-up from hospital patient was admitted and October for partial small follow-upbowel obstruction structure treated conservatively   follow-up on hypertension COPD A. fib        Past medical history obstructive sleep apnea, appendectomy, atrophic fibrillation, hypertension, varicose vein, stasis dermatitis, left biceps tendon repair, morbid obesity     Nonsmoker social drinker     Mother had COPD father had coronary artery disease       Objective   /84   Ht 1.829 m (6')   Wt (!) 204 kg (449 lb)   BMI 60.90 kg/m²     Physical Exam  Vitals reviewed.   Constitutional:       Appearance: Normal appearance.   HENT:      Head: Normocephalic and atraumatic.      Right Ear: Tympanic membrane, ear canal and external ear normal.      Left Ear: Tympanic membrane, ear canal and external ear normal.      Nose: Nose normal.      Mouth/Throat:      Pharynx: Oropharynx is clear.   Eyes:      Extraocular Movements: Extraocular movements intact.      Conjunctiva/sclera: Conjunctivae normal.      Pupils: Pupils are equal, round, and reactive to light.   Cardiovascular:      Rate and Rhythm: Normal rate and regular rhythm.      Pulses: Normal pulses.      Heart sounds: Normal heart sounds.   Pulmonary:      Effort: Pulmonary effort is normal.      Breath sounds: Normal breath sounds. No wheezing.   Abdominal:      General: Abdomen is flat. Bowel sounds are normal.      Palpations: Abdomen is soft.   Musculoskeletal:      Cervical back: Normal range of motion and neck supple.   Skin:     General: Skin is warm and  dry.      Findings: Erythema and rash present.      Comments: Erythema over the ecchymosis of left knee   Neurological:      General: No focal deficit present.      Mental Status: He is alert and oriented to person, place, and time.   Psychiatric:         Mood and Affect: Mood normal.         Assessment/Plan   Problem List Items Addressed This Visit           ICD-10-CM       Cardiac and Vasculature    Benign essential hypertension I10    Relevant Orders    CBC    Comprehensive Metabolic Panel    Hyperlipidemia E78.5    Relevant Orders    Lipid Panel     Other Visit Diagnoses         Codes      Type 2 diabetes mellitus without complication, unspecified whether long term insulin use     E11.9    Relevant Medications    metFORMIN (Glucophage) 1,000 mg tablet    blood-glucose meter misc    blood sugar diagnostic (Blood Glucose Test)    lancets misc    Other Relevant Orders    Hemoglobin A1C      Elevated PSA     R97.20    Relevant Orders    Referral to Urology        past recap  Patient is due for follow-up of his aortic stenosis  Ordered echocardiogram  Blood work ordered  Medications ordered  Encourage patient to lose weight diet and exercise  Be compliant with BiPAP  Follow-up after blood work     9/27/22  We will x-ray the lungs  May be the leg swelling related to CHF  Patient does follow with a cardiologist  He did echo with him  Will start Lasix 20 mg a day  Advised to follow-up with the cardiologist  Blood work ordered  Blood pressure is okay  Patient is noncompliant with CPAP which can also lead to right-sided heart failure  Encouraged to lose weight  Low-salt diet  Keep legs elevated  If not better follow-up     12/12/22  Leg swelling doing little better  Patient not taking Lasix on regular basis  Did not do blood work yet  Explained that x-ray had showed some congestive heart failure needs to take Lasix every day needs to do blood work  Medications refilled  Follow-up in 3 months/after blood work  Patient  really needs to lose weight  Discussed diet and exercise      3/17/23  Blood pressure is stable at 110/70  Blood work shows bicarb slightly elevated hemoglobin 17.7 elevated  Patient probably still has sleep apnea causing these changes  He is refusing to go for CPAP  He still appears to be slightly volume overloaded  We will check chest x-ray  Lasix refilled  Blood pressure is stable cholesterol is okay  Follow-up blood work in 3 months  Again encourage patient to lose weight  Discussed weight loss options  Patient is now interested in any options at this time he is going to work with diet and exercise follow-up in 3 months     5/3/2023  Patient is in A-fib  Heart rate is in 90s at rest  EKG shows A-fib  I am wondering if patient is having CHF  He had watchman's procedure and does not take anticoagulants  Chest x-ray ordered  Take double the dose of Lasix  Advised patient to see the cardiologist  Also recommended him to go to the emergency room as he appears to be in congestive heart failure  Patient will wait for the x-ray results and contact his cardiologist     5/15/2023  Chest x-ray showed congestive heart failure BNP 64 only LDL 50  Clinically patient is doing much better breathing wise  He still has leg swelling but is sleeping recliner  Continue maintenance Lasix  Patient is staying in A-fib.  Watchman's procedure  Advised to see the cardiologist sooner  Again encourage patient to go for sleep doctor and adjust mask for CPAP but he is refusing but he states he will use the mouthpiece  We will try Flonase for the nasal congestion  Follow-up blood work in 3 months     9/25/2023  Breathing treatment given  We will do a stat chest x-ray  Zithromax for 10 days  Albuterol inhaler as needed  Rule out possibility of CHF   decide further after x-ray    9/26/2023  Chest x-ray shows congestive heart failure  EKG shows A-fib  BMP ordered  Explained patient that it looks like congestive heart failure he should be in the  hospital but he is refusing because he feels better already  Will increase Lasix 40 mg twice daily  Follow-up in a week  Advised to also follow-up with the cardiologist      12 /4/2023  Patient is tolerating diuretics  Lasix is helping to keep the swelling down in the legs  Follow-up blood work in 3 months  Blood pressure stable  Treat with Z-Luis  Bronchitis could be possibly related to acid reflux  Try Protonix 40 mg a day  Patient advised not to snack at late hours     2/26/2024  Blood pressure is borderline high  Will order blood work CBC CMP fasting with TSH BNP to assess CHF and vitamins  Medications refilled  Follow-up blood work in 3 months  Refills given  Again encourage patient to lose weight    7/8/2024  Will send urine for culture  CT of the abdomen and pelvis to rule out kidney stone  CAT scan reviewed no kidney stone  Advised patient to follow-up with urologist for cystoscopy hematuria persists    9/20/2024  Will x-ray left foot  Blood pressure stable  Blood work ordered  Patient has bilateral cerumen impaction  Use mineral oil and follow-up for cleaning  Since patient is on blood thinners needs to be careful    12/15/2024  Syringing and curettage done in the right ear with complete removal of the wax  Patient tolerated procedure well  Hearing improved  Routine blood work ordered  Follow-up after blood    5/19/2025  Keflex given for cellulitis  Will check overnight pulse oximetry to make sure patient does not need oxygen at night  Blood pressures little elevated which could be from the recent trauma  Ordered blood work  Follow-up after blood work     5/22/2025  Blood work reviewed  Hemoglobin A1c 7  Suggested semaglutide to help lose weight and improve blood sugar but patient is not interested in doing that  Increase metformin thousand twice a day  He says he is working on weight and exercise and diet  Bicarb is high.  Patient does not use BiPAP/CPAP for sleep apnea  Waiting for overnight oxygen  report  Elevated PSA refer to urologist  Follow-up blood work in 3 months

## 2025-06-03 ENCOUNTER — OFFICE VISIT (OUTPATIENT)
Dept: UROLOGY | Facility: HOSPITAL | Age: 65
End: 2025-06-03
Payer: COMMERCIAL

## 2025-06-03 VITALS — BODY MASS INDEX: 57.64 KG/M2 | WEIGHT: 315 LBS

## 2025-06-03 DIAGNOSIS — R97.20 ELEVATED PSA: Primary | ICD-10-CM

## 2025-06-03 LAB
POC APPEARANCE, URINE: CLEAR
POC BILIRUBIN, URINE: NEGATIVE
POC BLOOD, URINE: NEGATIVE
POC COLOR, URINE: YELLOW
POC GLUCOSE, URINE: NEGATIVE MG/DL
POC KETONES, URINE: ABNORMAL MG/DL
POC LEUKOCYTES, URINE: ABNORMAL
POC NITRITE,URINE: NEGATIVE
POC PH, URINE: 7 PH
POC PROTEIN, URINE: ABNORMAL MG/DL
POC SPECIFIC GRAVITY, URINE: 1.02
POC UROBILINOGEN, URINE: 4 EU/DL

## 2025-06-03 PROCEDURE — 81003 URINALYSIS AUTO W/O SCOPE: CPT | Mod: QW | Performed by: NURSE PRACTITIONER

## 2025-06-03 PROCEDURE — 99214 OFFICE O/P EST MOD 30 MIN: CPT | Mod: 25 | Performed by: NURSE PRACTITIONER

## 2025-06-03 PROCEDURE — 51798 US URINE CAPACITY MEASURE: CPT | Performed by: NURSE PRACTITIONER

## 2025-06-03 PROCEDURE — 1036F TOBACCO NON-USER: CPT | Performed by: NURSE PRACTITIONER

## 2025-06-03 PROCEDURE — 99204 OFFICE O/P NEW MOD 45 MIN: CPT | Performed by: NURSE PRACTITIONER

## 2025-06-03 ASSESSMENT — PAIN SCALES - GENERAL: PAINLEVEL_OUTOF10: 0-NO PAIN

## 2025-06-03 NOTE — PROGRESS NOTES
[facility]  Outpatient Clinic Note    Patient Name:  Burke Talley  MRN:  [id number]  :  1960    Referring Provider: Lubna Negro MD  Date of Service: 6/3/2025   Visit type: New patient visit     problem list/Chief complaint:  Elevated PSA - 11.09 on 25      HISTORY OF PRESENT ILLNESS:  Burke Talley is a 64 y.o. male with past medical history of HTN, HLD, DM2, elevated PSA, aortic stenosis, asthma, Afib, CARLOS, stroke, obesity, CHF, who presents for initial Urology visit. I performed a detailed review of the medical chart records lab testing and imaging. Patient referred to Urology by his PCP for elevated PSA.  Patient is wondering if elevated PSA is related to his obesity. He has been eating healthier and has lost about 25 lbs. He states he fell a couple week ago and injured his left knee. Patient has no urinary complaints today. Denies hematuria, dysuria, flank pain, and bothersome frequency or urgency. PVR 16 ml.      Urinary Difficulties? no  Unexpected weight loss? no  Bone pain? no  Fatigue? yes  Family history of prostate cancer? no  Previous biopsy? no  Smoker? no    PAST MEDICAL HISTORY:  Medical History[1]    PAST SURGICAL HISTORY:  Surgical History[2]    ALLERGIES:  Allergies[3]    MEDICATIONS:  Current Outpatient Medications   Medication Instructions    albuterol 90 mcg/actuation inhaler 2 puffs, Every 4 hours PRN    aspirin 325 mg, Daily    atenolol (TENORMIN) 25 mg, oral, Daily    blood sugar diagnostic (Blood Glucose Test) 1 strip, miscellaneous, 2 times daily    blood-glucose meter misc Test twice daily    cholecalciferol, vitamin D3, 75 mcg (3,000 unit) tablet Take by mouth.    fish oil concentrate (Omega-3) 120-180 mg capsule 2 capsules, Daily RT    furosemide (LASIX) 40 mg, oral, 2 times daily    lancets misc Test twice daily    magnesium oxide (Mag-Ox) 400 mg tablet Take by mouth.    metFORMIN (GLUCOPHAGE) 1,000 mg, oral, 2 times daily (morning and late afternoon)     multivitamin tablet 1 tablet, Daily    pantoprazole (PROTONIX) 40 mg, oral, 2 times daily, Do not crush, chew, or split.    rosuvastatin (CRESTOR) 20 mg, oral, Daily        SOCIAL HISTORY:  Social History[4]     FAMILY HISTORY:  Family History[5]     REVIEW OF SYSTEMS:  10-pt ROS reviewed and negative except as mentioned above.    Vital signs:  Weight (!) 193 kg (425 lb).    PHYSICAL EXAMINATION:  General: Appears comfortable and in no apparent distress.  Head: Normocephalic, atraumatic  Eyes: Non-injected conjunctiva, sclera clear, no proptosis  Lungs: Breathing is easy, non-labored. Speaking in clear and complete sentences. Normal diaphragmatic movement.  Cardiovascular: no peripheral edema, cyanosis or pallor.   Abdomen: soft, non-distended, non-tender  : Bladder: non tender, not distended  MSK: Ambulatory with steady gait, unassisted  Skin: No visible rashes or lesions  Neurologic: Alert, oriented to person, place, and time  Psychiatric: mood and affect appropriate      IMAGING DATA:   === 07/08/24 ===    CT ABDOMEN PELVIS WO IV CONTRAST    - Impression -  Resolution is limited due to the patient's size.    There are no renal calculi. There are no ureteral calculi.    The bladder is unremarkable.    MACRO:  none    Signed by: Shania Hdz 7/8/2024 12:14 PM  Dictation workstation:   [id number]    LABORATORY DATA:    Lab Results   Component Value Date    WBC 9.0 05/20/2025    HGB 16.2 05/20/2025    HCT 53.1 (H) 05/20/2025    MCV 97.3 05/20/2025     (L) 05/20/2025     Lab Results   Component Value Date    GLUCOSE 113 (H) 05/20/2025    CALCIUM 8.8 05/20/2025     05/20/2025    K 4.6 05/20/2025    CO2 33 (H) 05/20/2025    CL 99 05/20/2025    BUN 32 (H) 05/20/2025    CREATININE 1.26 05/20/2025     Lab Results   Component Value Date    PSA 11.09 (H) 05/20/2025    PSA 3.06 05/04/2023     Office Visit on 06/03/2025   Component Date Value Ref Range Status    POC Color, Urine 06/03/2025 Yellow  Straw,  Yellow, Light-Yellow Final    POC Appearance, Urine 06/03/2025 Clear  Clear Final    POC Glucose, Urine 06/03/2025 NEGATIVE  NEGATIVE mg/dl Final    POC Bilirubin, Urine 06/03/2025 NEGATIVE  NEGATIVE Final    POC Ketones, Urine 06/03/2025 TRACE (A)  NEGATIVE mg/dl Final    POC Specific Gravity, Urine 06/03/2025 1.020  1.005 - 1.035 Final    POC Blood, Urine 06/03/2025 NEGATIVE  NEGATIVE Final    POC PH, Urine 06/03/2025 7.0  No Reference Range Established PH Final    POC Protein, Urine 06/03/2025 >=300 (3+) (A)  NEGATIVE mg/dl Final    POC Urobilinogen, Urine 06/03/2025 4.0 (A)  0.2, 1.0 EU/DL Final    Poc Nitrite, Urine 06/03/2025 NEGATIVE  NEGATIVE Final    POC Leukocytes, Urine 06/03/2025 TRACE (A)  NEGATIVE Final       ASSESSMENT:  Burke Talley is a 64 y.o. male with elevated PSA, who presents for initial Urology visit.    Today we discussed PSA as a tool to screen gentleman for prostate cancer. We discussed Prostate cancer screening, and that it is recommended for men aged 55-69, but can also start early in high risk patients ( and patients with family history of prostate cancer) and go longer in active, healthy men.  We discussed the screening process involves a digital rectal exam (GOYO) and blood test (PSA).      We discussed that many factors can elevate the PSA, and when the PSA is elevated further testing is warranted.     I discussed with the patient that the prostate MRI has a high sensitivity for high-grade prostate cancer lesions but a lower sensitivity for low to intermediate prostate cancer lesions. We discussed that currently MRI is not considered to be a standard of care for prostate cancer screening, therefore self-pay option is available.    PLAN:  -UA negative for infection, trace leukocytes, protein, urobilinogen and ketones noted.  -PVR 16 ml  -Patient to follow up with PCP regarding elevated bilirubin levels  -Will repeat PSA today (ordered)  -Virtual follow up in 1 week to  review PSA results, or sooner if needed    All questions and concerns were addressed. Patient verbalizes understanding and has no other questions at this time.     E&M visit today is associated with current or anticipated ongoing medical care services related to a patient's single, serious condition or a complex condition.    BEULAH Dick-CNP  [facility]  6/3/2025 3:22 PM         [1]   Past Medical History:  Diagnosis Date    Atrial fibrillation (Multi)     Hyperlipidemia     Hypertension     Sleep apnea    [2]   Past Surgical History:  Procedure Laterality Date    APPENDECTOMY  03/02/2017    Appendectomy    MR HEAD ANGIO WO IV CONTRAST  5/8/2019    MR HEAD ANGIO WO IV CONTRAST [facility]   [3] No Known Allergies  [4]   Social History  Tobacco Use    Smoking status: Never    Smokeless tobacco: Never   Substance Use Topics    Alcohol use: Not Currently     Alcohol/week: 12.0 standard drinks of alcohol     Types: 12 Standard drinks or equivalent per week    Drug use: Never   [5]   Family History  Problem Relation Name Age of Onset    COPD Mother      Heart disease Mother      Heart disease Father      Other (wegener's syndrome) Father

## 2025-06-04 LAB — PSA SERPL-MCNC: 11.5 NG/ML

## 2025-06-09 NOTE — PROGRESS NOTES
[facility]  Outpatient Clinic Note    Patient Name:  Burke Talley  MRN:  [id number]  :  1960  Date of Service: 6/10/2025     Visit type: Follow up visit    Virtual or Telephone Consent    An interactive audio and video telecommunication system which permits real time communications between the patient (at the originating site) and provider (at the distant site) was utilized to provide this telehealth service.   Verbal consent was requested and obtained from Burke Talley on this date, 06/10/25 for a telehealth visit and the patient's location was confirmed at the time of the visit.    HPI    Interval History:  Burke Talley is a 64 y.o. male with past medical history of HTN, HLD, DM2, elevated PSA, aortic stenosis, asthma, Afib, CARLOS, stroke, obesity, CHF, who is being seen today for  problems listed below.     Problem list/Chief complaints:  Elevated PSA - 11.09 on 25, 11.50 on 6/3/25       6/3/25: NPV. Patient referred to Urology by his PCP for elevated PSA.  Patient is wondering if elevated PSA is related to his obesity. He has been eating healthier and has lost about 25 lbs. He states he fell a couple week ago and injured his left knee. Patient has no urinary complaints today. Denies hematuria, dysuria, flank pain, and bothersome frequency or urgency. PVR 16 ml.      Urinary Difficulties? no  Unexpected weight loss? no  Bone pain? no  Fatigue? yes  Family history of prostate cancer? no  Previous biopsy? no  Smoker? no    6/10/25: Patient is being seen virtually for follow up and to review PSA results. He reports no changes since his last visit, he has no urinary issues.    Medical History[1]    Surgical History[2]    Social History     Socioeconomic History    Marital status:      Spouse name: Not on file    Number of children: Not on file    Years of education: Not on file    Highest education level: Not on file   Occupational History    Not on file   Tobacco Use    Smoking  status: Never    Smokeless tobacco: Never   Substance and Sexual Activity    Alcohol use: Not Currently     Alcohol/week: 12.0 standard drinks of alcohol     Types: 12 Standard drinks or equivalent per week    Drug use: Never    Sexual activity: Defer   Other Topics Concern    Not on file   Social History Narrative    Not on file     Social Drivers of Health     Financial Resource Strain: Not on file   Food Insecurity: Not on file   Transportation Needs: Not on file   Physical Activity: Not on file   Stress: Not on file   Social Connections: Not on file   Intimate Partner Violence: Not on file   Housing Stability: Not on file       Allergies[3]     Current Medications[4]     Review of system:  All other systems have been reviewed and are negative for complaints      Last recorded vitals:  There were no vitals taken for this visit.    Physical Exam: Limited due to virtual visit  General: Appears comfortable and in no apparent distress.  Head: Normocephalic, atraumatic  Eyes: Non-injected conjunctiva, sclera clear, no proptosis  Lungs: Breathing is easy, non-labored. Speaking in clear and complete sentences. Normal diaphragmatic movement.  Neurologic: Alert, oriented to person, place, and time  Psychiatric: mood and affect appropriate      Imaging  === 07/08/24 ===    CT ABDOMEN PELVIS WO IV CONTRAST    - Impression -  Resolution is limited due to the patient's size.    There are no renal calculi. There are no ureteral calculi.    The bladder is unremarkable.    MACRO:  none    Signed by: Shania Hdz 7/8/2024 12:14 PM  Dictation workstation:   [id number]      Labs  Office Visit on 06/03/2025   Component Date Value    POC Color, Urine 06/03/2025 Yellow     POC Appearance, Urine 06/03/2025 Clear     POC Glucose, Urine 06/03/2025 NEGATIVE     POC Bilirubin, Urine 06/03/2025 NEGATIVE     POC Ketones, Urine 06/03/2025 TRACE (A)     POC Specific Gravity, Ur* 06/03/2025 1.020     POC Blood, Urine 06/03/2025 NEGATIVE      POC PH, Urine 06/03/2025 7.0     POC Protein, Urine 06/03/2025 >=300 (3+) (A)     POC Urobilinogen, Urine 06/03/2025 4.0 (A)     Poc Nitrite, Urine 06/03/2025 NEGATIVE     POC Leukocytes, Urine 06/03/2025 TRACE (A)     PSA, TOTAL 06/03/2025 11.50 (H)        Assessment and Plan:  Burke Talley is a 64 y.o. male with history of elevated PSA, who is being seen virtually for follow up after repeat PSA.     Today we discussed PSA as a tool to screen gentleman for prostate cancer. We discussed Prostate cancer screening, and that it is recommended for men aged 55-69, but can also start early in high risk patients ( and patients with family history of prostate cancer) and go longer in active, healthy men.  We discussed the screening process involves a digital rectal exam (GOYO) and blood test (PSA).      We discussed that many factors can elevate the PSA, and when the PSA is elevated further testing is warranted.     I discussed with the patient that the prostate MRI has a high sensitivity for high-grade prostate cancer lesions but a lower sensitivity for low to intermediate prostate cancer lesions. We discussed that currently MRI is not considered to be a standard of care for prostate cancer screening, therefore self-pay option is available.    Plan:  -Reviewed PSA results with patient which has slightly increased to 11.50 from 11.09 on 5/20/25  -MRI prostate ordered (patient requesting open MRI  -Follow-up after MRI, or sooner if needed, to reassess symptoms.    All questions and concerns were addressed. Patient verbalizes understanding and has no other questions at this time.     Some elements copied from my note on 6/3/25, the elements have been updated and all reflect current decision making from today, 06/10/25    E&M visit today is associated with current or anticipated ongoing medical care services related to a patient's single, serious condition or a complex condition.    Asmita Contreras, APRN-CNP    [facility]  06/10/25 12:29 PM         [1]   Past Medical History:  Diagnosis Date    Atrial fibrillation (Multi)     Hyperlipidemia     Hypertension     Sleep apnea    [2]   Past Surgical History:  Procedure Laterality Date    APPENDECTOMY  03/02/2017    Appendectomy    MR HEAD ANGIO WO IV CONTRAST  5/8/2019    MR HEAD ANGIO WO IV CONTRAST [facility]   [3] No Known Allergies  [4]   Current Outpatient Medications:     albuterol 90 mcg/actuation inhaler, Inhale 2 puffs every 4 hours if needed., Disp: , Rfl:     aspirin 325 mg tablet, Take 1 tablet (325 mg) by mouth once daily., Disp: , Rfl:     atenolol (Tenormin) 25 mg tablet, Take 1 tablet (25 mg) by mouth once daily., Disp: 90 tablet, Rfl: 3    blood sugar diagnostic (Blood Glucose Test), 1 strip 2 times a day., Disp: 100 strip, Rfl: 2    blood-glucose meter misc, Test twice daily, Disp: 1 each, Rfl: 0    cholecalciferol, vitamin D3, 75 mcg (3,000 unit) tablet, Take by mouth., Disp: , Rfl:     fish oil concentrate (Omega-3) 120-180 mg capsule, Take 2 capsules (2 g) by mouth once daily., Disp: , Rfl:     furosemide (Lasix) 40 mg tablet, Take 1 tablet (40 mg) by mouth 2 times a day. (Patient taking differently: Take 1 tablet (40 mg) by mouth once daily.), Disp: 90 tablet, Rfl: 1    lancets misc, Test twice daily, Disp: 100 each, Rfl: 3    magnesium oxide (Mag-Ox) 400 mg tablet, Take by mouth., Disp: , Rfl:     metFORMIN (Glucophage) 1,000 mg tablet, Take 1 tablet (1,000 mg) by mouth 2 times daily (morning and late afternoon)., Disp: 180 tablet, Rfl: 0    multivitamin tablet, Take 1 tablet by mouth once daily., Disp: , Rfl:     pantoprazole (ProtoNix) 40 mg EC tablet, Take 1 tablet (40 mg) by mouth 2 times a day. Do not crush, chew, or split., Disp: 60 tablet, Rfl: 2    rosuvastatin (Crestor) 20 mg tablet, TAKE 1 TABLET DAILY, Disp: 90 tablet, Rfl: 3

## 2025-06-10 ENCOUNTER — TELEPHONE (OUTPATIENT)
Dept: UROLOGY | Facility: HOSPITAL | Age: 65
End: 2025-06-10

## 2025-06-10 ENCOUNTER — TELEMEDICINE (OUTPATIENT)
Dept: UROLOGY | Facility: HOSPITAL | Age: 65
End: 2025-06-10
Payer: COMMERCIAL

## 2025-06-10 DIAGNOSIS — R97.20 ELEVATED PSA: Primary | ICD-10-CM

## 2025-06-10 PROCEDURE — 99213 OFFICE O/P EST LOW 20 MIN: CPT | Performed by: NURSE PRACTITIONER

## 2025-06-10 PROCEDURE — 1036F TOBACCO NON-USER: CPT | Performed by: NURSE PRACTITIONER

## 2025-06-10 NOTE — TELEPHONE ENCOUNTER
Called pt to schedule open MRI and fuv with Asmita Contreras.  Medical clearance is necessary for MRI due to questionnaire.  Pt will be notified in approximately 2 weeks from Radiology.  Pt was provided with direct number [phone] to call to schedule FUV with Asmita once MRI is scheduled

## 2025-06-17 ENCOUNTER — TELEPHONE (OUTPATIENT)
Dept: PRIMARY CARE | Facility: CLINIC | Age: 65
End: 2025-06-17
Payer: COMMERCIAL

## 2025-06-18 LAB
ALBUMIN SERPL-MCNC: 4.2 G/DL (ref 3.6–5.1)
ALP SERPL-CCNC: 36 U/L (ref 35–144)
ALT SERPL-CCNC: 16 U/L (ref 9–46)
ANION GAP SERPL CALCULATED.4IONS-SCNC: 12 MMOL/L (CALC) (ref 7–17)
AST SERPL-CCNC: 22 U/L (ref 10–35)
BILIRUB SERPL-MCNC: 0.9 MG/DL (ref 0.2–1.2)
BUN SERPL-MCNC: 29 MG/DL (ref 7–25)
CALCIUM SERPL-MCNC: 9.6 MG/DL (ref 8.6–10.3)
CHLORIDE SERPL-SCNC: 97 MMOL/L (ref 98–110)
CHOLEST SERPL-MCNC: 107 MG/DL
CHOLEST/HDLC SERPL: 2.9 (CALC)
CO2 SERPL-SCNC: 33 MMOL/L (ref 20–32)
CREAT SERPL-MCNC: 1.04 MG/DL (ref 0.7–1.35)
EGFRCR SERPLBLD CKD-EPI 2021: 80 ML/MIN/1.73M2
ERYTHROCYTE [DISTWIDTH] IN BLOOD BY AUTOMATED COUNT: 14 % (ref 11–15)
EST. AVERAGE GLUCOSE BLD GHB EST-MCNC: 148 MG/DL
EST. AVERAGE GLUCOSE BLD GHB EST-SCNC: 8.2 MMOL/L
GLUCOSE SERPL-MCNC: 95 MG/DL (ref 65–99)
HBA1C MFR BLD: 6.8 %
HCT VFR BLD AUTO: 54.3 % (ref 38.5–50)
HDLC SERPL-MCNC: 37 MG/DL
HGB BLD-MCNC: 16.6 G/DL (ref 13.2–17.1)
LDLC SERPL CALC-MCNC: 51 MG/DL (CALC)
MCH RBC QN AUTO: 29.4 PG (ref 27–33)
MCHC RBC AUTO-ENTMCNC: 30.6 G/DL (ref 32–36)
MCV RBC AUTO: 96.3 FL (ref 80–100)
NONHDLC SERPL-MCNC: 70 MG/DL (CALC)
PLATELET # BLD AUTO: 138 THOUSAND/UL (ref 140–400)
PMV BLD REES-ECKER: 11.5 FL (ref 7.5–12.5)
POTASSIUM SERPL-SCNC: 4.3 MMOL/L (ref 3.5–5.3)
PROT SERPL-MCNC: 6.8 G/DL (ref 6.1–8.1)
RBC # BLD AUTO: 5.64 MILLION/UL (ref 4.2–5.8)
SODIUM SERPL-SCNC: 142 MMOL/L (ref 135–146)
TRIGL SERPL-MCNC: 100 MG/DL
WBC # BLD AUTO: 6.5 THOUSAND/UL (ref 3.8–10.8)

## 2025-06-19 ENCOUNTER — OFFICE VISIT (OUTPATIENT)
Dept: PRIMARY CARE | Facility: CLINIC | Age: 65
End: 2025-06-19
Payer: COMMERCIAL

## 2025-06-19 VITALS
SYSTOLIC BLOOD PRESSURE: 149 MMHG | DIASTOLIC BLOOD PRESSURE: 82 MMHG | BODY MASS INDEX: 42.66 KG/M2 | WEIGHT: 315 LBS | HEIGHT: 72 IN

## 2025-06-19 DIAGNOSIS — R20.0 NUMBNESS AND TINGLING OF LEFT LEG: Primary | ICD-10-CM

## 2025-06-19 DIAGNOSIS — E66.01 MORBID OBESITY (MULTI): ICD-10-CM

## 2025-06-19 DIAGNOSIS — E78.2 MIXED HYPERLIPIDEMIA: ICD-10-CM

## 2025-06-19 DIAGNOSIS — G47.33 OBSTRUCTIVE SLEEP APNEA, ADULT: ICD-10-CM

## 2025-06-19 DIAGNOSIS — R20.2 NUMBNESS AND TINGLING OF LEFT LEG: Primary | ICD-10-CM

## 2025-06-19 DIAGNOSIS — E11.9 TYPE 2 DIABETES MELLITUS WITHOUT COMPLICATION, UNSPECIFIED WHETHER LONG TERM INSULIN USE: ICD-10-CM

## 2025-06-19 DIAGNOSIS — I10 BENIGN ESSENTIAL HYPERTENSION: ICD-10-CM

## 2025-06-19 DIAGNOSIS — R97.20 ELEVATED PSA: ICD-10-CM

## 2025-06-19 PROCEDURE — 3079F DIAST BP 80-89 MM HG: CPT | Performed by: INTERNAL MEDICINE

## 2025-06-19 PROCEDURE — 99214 OFFICE O/P EST MOD 30 MIN: CPT | Performed by: INTERNAL MEDICINE

## 2025-06-19 PROCEDURE — 3077F SYST BP >= 140 MM HG: CPT | Performed by: INTERNAL MEDICINE

## 2025-06-19 PROCEDURE — 3008F BODY MASS INDEX DOCD: CPT | Performed by: INTERNAL MEDICINE

## 2025-07-03 ENCOUNTER — OFFICE VISIT (OUTPATIENT)
Dept: UROLOGY | Facility: HOSPITAL | Age: 65
End: 2025-07-03
Payer: COMMERCIAL

## 2025-07-03 DIAGNOSIS — R97.20 ELEVATED PSA: Primary | ICD-10-CM

## 2025-07-03 PROCEDURE — 99214 OFFICE O/P EST MOD 30 MIN: CPT | Performed by: UROLOGY

## 2025-07-03 NOTE — PROGRESS NOTES
NP     HISTORY OF PRESENT ILLNESS:   Burke Talley is a 64 y.o. male who is being seen today for Elevated PSA.    Past Medical History:  No date: Atrial fibrillation (Multi)  No date: Hyperlipidemia  No date: Hypertension  No date: Sleep apnea     Past Surgical History:  03/02/2017: APPENDECTOMY      Comment:  Appendectomy  5/8/2019: MR HEAD ANGIO WO IV CONTRAST      Comment:  MR HEAD ANGIO WO IV CONTRAST [facility]    Allergies[1]    Current Medications[2]     PHYSICAL EXAM:  There were no vitals taken for this visit.    Constitutional: Patient appears well-developed and well-nourished. No distress.    Pulmonary/Chest: Effort normal. No respiratory distress.   Abdominal: Soft, ND NT  : WNL  Musculoskeletal: Normal range of motion.    Neurological: Alert and oriented to person, place, and time.  Psychiatric: Normal mood and affect. Behavior is normal. Thought content normal.      Labs:  Component      Latest Ref Rng 5/20/2025 6/3/2025   PSA, TOTAL      < OR = 4.00 ng/mL 11.09 (H)  11.50 (H)      Discussion:  Doing well. Patient has elevated PSA with the most recent one at 11.50. We planned on getting a standard biopsy. He is on aspirin 325 mg.     Assessment:      No diagnosis found.       Plan:   Follow up for Standard biopsy and then fu on 2 week to discuss biopsy results.  Switch to baby aspirin 10 days prior biopsy  All questions and concerns were addressed. Patient verbalizes understanding and has no other questions at this time.     Scribe Attestation  By signing my name below, I Bebe Juliann Shannon, Scrtejal   attest that this documentation has been prepared under the direction and in the presence of Baudilio Fairbanks MD.       [1] No Known Allergies  [2]   Current Outpatient Medications:     albuterol 90 mcg/actuation inhaler, Inhale 2 puffs every 4 hours if needed., Disp: , Rfl:     aspirin 325 mg tablet, Take 1 tablet (325 mg) by mouth once daily., Disp: , Rfl:     atenolol (Tenormin) 25 mg tablet,  Take 1 tablet (25 mg) by mouth once daily., Disp: 90 tablet, Rfl: 3    blood sugar diagnostic (Blood Glucose Test), 1 strip 2 times a day., Disp: 100 strip, Rfl: 2    blood-glucose meter misc, Test twice daily, Disp: 1 each, Rfl: 0    cholecalciferol, vitamin D3, 75 mcg (3,000 unit) tablet, Take by mouth., Disp: , Rfl:     fish oil concentrate (Omega-3) 120-180 mg capsule, Take 2 capsules (2 g) by mouth once daily., Disp: , Rfl:     furosemide (Lasix) 40 mg tablet, Take 1 tablet (40 mg) by mouth 2 times a day. (Patient taking differently: Take 1 tablet (40 mg) by mouth once daily.), Disp: 90 tablet, Rfl: 1    lancets misc, Test twice daily, Disp: 100 each, Rfl: 3    magnesium oxide (Mag-Ox) 400 mg tablet, Take by mouth., Disp: , Rfl:     metFORMIN (Glucophage) 1,000 mg tablet, Take 1 tablet (1,000 mg) by mouth 2 times daily (morning and late afternoon)., Disp: 180 tablet, Rfl: 0    multivitamin tablet, Take 1 tablet by mouth once daily., Disp: , Rfl:     pantoprazole (ProtoNix) 40 mg EC tablet, Take 1 tablet (40 mg) by mouth 2 times a day. Do not crush, chew, or split., Disp: 60 tablet, Rfl: 2    rosuvastatin (Crestor) 20 mg tablet, TAKE 1 TABLET DAILY, Disp: 90 tablet, Rfl: 3

## 2025-07-15 DIAGNOSIS — E78.2 MIXED HYPERLIPIDEMIA: ICD-10-CM

## 2025-07-15 RX ORDER — ROSUVASTATIN CALCIUM 20 MG/1
20 TABLET, COATED ORAL DAILY
Qty: 90 TABLET | Refills: 3 | Status: SHIPPED | OUTPATIENT
Start: 2025-07-15

## 2025-08-01 DIAGNOSIS — E78.2 MIXED HYPERLIPIDEMIA: ICD-10-CM

## 2025-08-01 DIAGNOSIS — I10 BENIGN ESSENTIAL HYPERTENSION: ICD-10-CM

## 2025-08-04 RX ORDER — CEFTRIAXONE 1 G/1
1 INJECTION, POWDER, FOR SOLUTION INTRAMUSCULAR; INTRAVENOUS ONCE
Status: COMPLETED | OUTPATIENT
Start: 2025-08-07 | End: 2025-08-07

## 2025-08-06 NOTE — PROGRESS NOTES
FUV    Last Visit: 7/3/25     HISTORY OF PRESENT ILLNESS:   Burke Talley is a 64 y.o. male who is being seen today for prostate biopsy     Past Medical History:  No date: Atrial fibrillation (Multi)  No date: Hyperlipidemia  No date: Hypertension  No date: Sleep apnea     Past Surgical History:  03/02/2017: APPENDECTOMY      Comment:  Appendectomy  5/8/2019: MR HEAD ANGIO WO IV CONTRAST      Comment:  MR HEAD ANGIO WO IV CONTRAST [facility]    Allergies[1]    Current Medications[2]     PHYSICAL EXAM:  Blood pressure (!) 143/97, pulse 73.    Constitutional: Patient appears well-developed and well-nourished. No distress.    Pulmonary/Chest: Effort normal. No respiratory distress.   Abdominal: Soft, ND NT  : WNL  Musculoskeletal: Normal range of motion.    Neurological: Alert and oriented to person, place, and time.  Psychiatric: Normal mood and affect. Behavior is normal. Thought content normal.      Labs:  Component      Latest Ref Rng 5/20/2025 6/3/2025   PSA, TOTAL      < OR = 4.00 ng/mL 11.09 (H)  11.50 (H)      Discussion:  Standard biopsy was completed today without complications and he tolerated the procedure well. Transrectal diagnostic ultrasound guidance for needle biopsy of the prostate was used. Specimen obtained from Right Base, Right Mid, Right [place], Left Base, Left Mid and Left [place]. Post-biopsy care instructions were provided to patient. Will follow up in 2 weeks to discuss biopsy results. Prostate volume: 44g      Assessment:      1. Elevated PSA  Surgical Pathology Exam      2. Prophylactic antibiotic  cefTRIAXone (Rocephin) vial 1 g      3. Lower urinary tract symptoms (LUTS)  POCT UA Automated manually resulted             Plan:   fu IN 2 weeks to discuss biopsy results.  All questions and concerns were addressed. Patient verbalizes understanding and has no other questions at this time.     Scribe Attestation  By signing my name below, Krista DICKSON Scribe   attest that this  documentation has been prepared under the direction and in the presence of Baudilio Fairbanks MD.         [1] No Known Allergies  [2]   Current Outpatient Medications:     albuterol 90 mcg/actuation inhaler, Inhale 2 puffs every 4 hours if needed., Disp: , Rfl:     aspirin 325 mg tablet, Take 1 tablet (325 mg) by mouth once daily., Disp: , Rfl:     atenolol (Tenormin) 25 mg tablet, Take 1 tablet (25 mg) by mouth once daily., Disp: 90 tablet, Rfl: 3    blood sugar diagnostic (Blood Glucose Test), 1 strip 2 times a day., Disp: 100 strip, Rfl: 2    blood-glucose meter misc, Test twice daily, Disp: 1 each, Rfl: 0    cholecalciferol, vitamin D3, 75 mcg (3,000 unit) tablet, Take by mouth., Disp: , Rfl:     fish oil concentrate (Omega-3) 120-180 mg capsule, Take 2 capsules (2 g) by mouth once daily., Disp: , Rfl:     furosemide (Lasix) 40 mg tablet, Take 1 tablet (40 mg) by mouth 2 times a day. (Patient taking differently: Take 1 tablet (40 mg) by mouth once daily.), Disp: 90 tablet, Rfl: 1    lancets misc, Test twice daily, Disp: 100 each, Rfl: 3    magnesium oxide (Mag-Ox) 400 mg tablet, Take by mouth., Disp: , Rfl:     metFORMIN (Glucophage) 1,000 mg tablet, Take 1 tablet (1,000 mg) by mouth 2 times daily (morning and late afternoon)., Disp: 180 tablet, Rfl: 0    multivitamin tablet, Take 1 tablet by mouth once daily., Disp: , Rfl:     pantoprazole (ProtoNix) 40 mg EC tablet, Take 1 tablet (40 mg) by mouth 2 times a day. Do not crush, chew, or split., Disp: 60 tablet, Rfl: 2    rosuvastatin (Crestor) 20 mg tablet, TAKE 1 TABLET DAILY, Disp: 90 tablet, Rfl: 3    Current Facility-Administered Medications:     cefTRIAXone (Rocephin) vial 1 g, 1 g, intramuscular, Once, Baudilio Fairbanks MD

## 2025-08-07 ENCOUNTER — PROCEDURE VISIT (OUTPATIENT)
Dept: UROLOGY | Facility: HOSPITAL | Age: 65
End: 2025-08-07
Payer: COMMERCIAL

## 2025-08-07 VITALS — SYSTOLIC BLOOD PRESSURE: 143 MMHG | DIASTOLIC BLOOD PRESSURE: 97 MMHG | HEART RATE: 73 BPM

## 2025-08-07 DIAGNOSIS — Z79.2 PROPHYLACTIC ANTIBIOTIC: ICD-10-CM

## 2025-08-07 DIAGNOSIS — R39.9 LOWER URINARY TRACT SYMPTOMS (LUTS): ICD-10-CM

## 2025-08-07 DIAGNOSIS — R97.20 ELEVATED PSA: ICD-10-CM

## 2025-08-07 LAB
POC APPEARANCE, URINE: CLEAR
POC BILIRUBIN, URINE: NEGATIVE
POC BLOOD, URINE: NEGATIVE
POC COLOR, URINE: ABNORMAL
POC GLUCOSE, URINE: NEGATIVE MG/DL
POC KETONES, URINE: NEGATIVE MG/DL
POC LEUKOCYTES, URINE: NEGATIVE
POC NITRITE,URINE: NEGATIVE
POC PH, URINE: 6 PH
POC PROTEIN, URINE: ABNORMAL MG/DL
POC SPECIFIC GRAVITY, URINE: 1.02
POC UROBILINOGEN, URINE: 4 EU/DL

## 2025-08-07 PROCEDURE — 55700 PR PROSTATE NEEDLE BIOPSY ANY APPROACH: CPT | Performed by: UROLOGY

## 2025-08-07 PROCEDURE — 76872 US TRANSRECTAL: CPT | Performed by: UROLOGY

## 2025-08-07 PROCEDURE — 81003 URINALYSIS AUTO W/O SCOPE: CPT | Mod: QW | Performed by: UROLOGY

## 2025-08-07 PROCEDURE — 76942 ECHO GUIDE FOR BIOPSY: CPT | Performed by: UROLOGY

## 2025-08-07 PROCEDURE — 96372 THER/PROPH/DIAG INJ SC/IM: CPT | Performed by: UROLOGY

## 2025-08-07 PROCEDURE — 2500000004 HC RX 250 GENERAL PHARMACY W/ HCPCS (ALT 636 FOR OP/ED): Performed by: UROLOGY

## 2025-08-07 PROCEDURE — 55700 HC BIOPSY OF PROSTATE,NEEDLE/PUNCH: CPT | Performed by: UROLOGY

## 2025-08-07 RX ADMIN — CEFTRIAXONE 1 G: 1 INJECTION, POWDER, FOR SOLUTION INTRAMUSCULAR; INTRAVENOUS at 14:45

## 2025-08-19 LAB
LAB AP ASR DISCLAIMER: NORMAL
LAB AP BLOCK FOR ADDITIONAL STUDIES: NORMAL
LABORATORY COMMENT REPORT: NORMAL
PATH REPORT.FINAL DX SPEC: NORMAL
PATH REPORT.GROSS SPEC: NORMAL
PATH REPORT.RELEVANT HX SPEC: NORMAL
PATH REPORT.TOTAL CANCER: NORMAL

## 2025-08-21 ENCOUNTER — OFFICE VISIT (OUTPATIENT)
Dept: UROLOGY | Facility: HOSPITAL | Age: 65
End: 2025-08-21
Payer: COMMERCIAL

## 2025-08-21 DIAGNOSIS — C61 PROSTATE CANCER (MULTI): ICD-10-CM

## 2025-08-21 PROCEDURE — 99212 OFFICE O/P EST SF 10 MIN: CPT

## 2025-08-21 PROCEDURE — 99214 OFFICE O/P EST MOD 30 MIN: CPT | Performed by: UROLOGY

## 2025-08-25 DIAGNOSIS — L03.116 CELLULITIS OF LEFT LOWER EXTREMITY: ICD-10-CM

## 2025-08-25 DIAGNOSIS — E11.9 TYPE 2 DIABETES MELLITUS WITHOUT COMPLICATION, UNSPECIFIED WHETHER LONG TERM INSULIN USE: ICD-10-CM

## 2025-08-25 RX ORDER — METFORMIN HYDROCHLORIDE 1000 MG/1
1000 TABLET ORAL
Qty: 180 TABLET | Refills: 0 | Status: SHIPPED | OUTPATIENT
Start: 2025-08-25 | End: 2026-09-29

## 2025-08-25 RX ORDER — PANTOPRAZOLE SODIUM 40 MG/1
40 TABLET, DELAYED RELEASE ORAL 2 TIMES DAILY
Qty: 60 TABLET | Refills: 0 | Status: SHIPPED | OUTPATIENT
Start: 2025-08-25

## 2025-08-28 ENCOUNTER — HOSPITAL ENCOUNTER (OUTPATIENT)
Dept: RADIOLOGY | Facility: CLINIC | Age: 65
Discharge: HOME | End: 2025-08-28
Payer: COMMERCIAL

## 2025-08-28 DIAGNOSIS — C61 PROSTATE CANCER (MULTI): ICD-10-CM

## 2025-08-28 PROCEDURE — 78815 PET IMAGE W/CT SKULL-THIGH: CPT

## 2025-08-28 PROCEDURE — A9800 HC RX 343 DIAGNOSTIC RADIOPHARMACEUTICALS: HCPCS | Performed by: UROLOGY

## 2025-08-28 PROCEDURE — 3430000001 HC RX 343 DIAGNOSTIC RADIOPHARMACEUTICALS: Performed by: UROLOGY

## 2025-08-28 RX ADMIN — KIT FOR THE PREPARATION OF GALLIUM GA 68 GOZETOTIDE 5.95 MILLICURIE: 25 INJECTION, POWDER, LYOPHILIZED, FOR SOLUTION INTRAVENOUS at 12:41

## 2025-09-03 ENCOUNTER — HOSPITAL ENCOUNTER (OUTPATIENT)
Dept: RADIATION ONCOLOGY | Facility: CLINIC | Age: 65
Setting detail: RADIATION/ONCOLOGY SERIES
Discharge: HOME | End: 2025-09-03
Payer: COMMERCIAL

## 2025-09-03 VITALS
BODY MASS INDEX: 54.73 KG/M2 | WEIGHT: 315 LBS | SYSTOLIC BLOOD PRESSURE: 148 MMHG | HEART RATE: 92 BPM | TEMPERATURE: 97.3 F | DIASTOLIC BLOOD PRESSURE: 94 MMHG | OXYGEN SATURATION: 89 % | RESPIRATION RATE: 18 BRPM

## 2025-09-03 DIAGNOSIS — C61 PROSTATE CA (MULTI): Primary | ICD-10-CM

## 2025-09-03 PROCEDURE — 99215 OFFICE O/P EST HI 40 MIN: CPT | Performed by: RADIOLOGY

## 2025-09-03 PROCEDURE — 99205 OFFICE O/P NEW HI 60 MIN: CPT | Performed by: RADIOLOGY

## 2025-09-03 SDOH — ECONOMIC STABILITY: INCOME INSECURITY: IN THE LAST 12 MONTHS, WAS THERE A TIME WHEN YOU WERE NOT ABLE TO PAY THE MORTGAGE OR RENT ON TIME?: NO

## 2025-09-03 SDOH — ECONOMIC STABILITY: FOOD INSECURITY: WITHIN THE PAST 12 MONTHS, THE FOOD YOU BOUGHT JUST DIDN'T LAST AND YOU DIDN'T HAVE MONEY TO GET MORE.: NEVER TRUE

## 2025-09-03 SDOH — ECONOMIC STABILITY: FOOD INSECURITY: WITHIN THE PAST 12 MONTHS, YOU WORRIED THAT YOUR FOOD WOULD RUN OUT BEFORE YOU GOT MONEY TO BUY MORE.: NEVER TRUE

## 2025-09-03 SDOH — ECONOMIC STABILITY: TRANSPORTATION INSECURITY
IN THE PAST 12 MONTHS, HAS LACK OF TRANSPORTATION KEPT YOU FROM MEETINGS, WORK, OR FROM GETTING THINGS NEEDED FOR DAILY LIVING?: NO

## 2025-09-03 SDOH — ECONOMIC STABILITY: TRANSPORTATION INSECURITY
IN THE PAST 12 MONTHS, HAS THE LACK OF TRANSPORTATION KEPT YOU FROM MEDICAL APPOINTMENTS OR FROM GETTING MEDICATIONS?: NO

## 2025-09-03 ASSESSMENT — SOCIAL DETERMINANTS OF HEALTH (SDOH)
IN THE PAST 12 MONTHS, HAS THE ELECTRIC, GAS, OIL, OR WATER COMPANY THREATENED TO SHUT OFF SERVICE IN YOUR HOME?: NO
WITHIN THE LAST YEAR, HAVE YOU BEEN KICKED, HIT, SLAPPED, OR OTHERWISE PHYSICALLY HURT BY YOUR PARTNER OR EX-PARTNER?: NO
WITHIN THE LAST YEAR, HAVE TO BEEN RAPED OR FORCED TO HAVE ANY KIND OF SEXUAL ACTIVITY BY YOUR PARTNER OR EX-PARTNER?: NO
WITHIN THE LAST YEAR, HAVE YOU BEEN HUMILIATED OR EMOTIONALLY ABUSED IN OTHER WAYS BY YOUR PARTNER OR EX-PARTNER?: NO
HOW HARD IS IT FOR YOU TO PAY FOR THE VERY BASICS LIKE FOOD, HOUSING, MEDICAL CARE, AND HEATING?: NOT HARD AT ALL
WITHIN THE LAST YEAR, HAVE YOU BEEN AFRAID OF YOUR PARTNER OR EX-PARTNER?: NO

## 2025-09-03 ASSESSMENT — ENCOUNTER SYMPTOMS
CONSTITUTIONAL NEGATIVE: 1
PSYCHIATRIC NEGATIVE: 1
LOSS OF SENSATION IN FEET: 0
EYES NEGATIVE: 1
MUSCULOSKELETAL NEGATIVE: 1
OCCASIONAL FEELINGS OF UNSTEADINESS: 0
DIARRHEA: 1
SHORTNESS OF BREATH: 1
NEUROLOGICAL NEGATIVE: 1
PALPITATIONS: 1
ENDOCRINE NEGATIVE: 1
LEG SWELLING: 1
DEPRESSION: 0
BRUISES/BLEEDS EASILY: 1

## 2025-09-03 ASSESSMENT — COLUMBIA-SUICIDE SEVERITY RATING SCALE - C-SSRS
1. IN THE PAST MONTH, HAVE YOU WISHED YOU WERE DEAD OR WISHED YOU COULD GO TO SLEEP AND NOT WAKE UP?: NO
2. HAVE YOU ACTUALLY HAD ANY THOUGHTS OF KILLING YOURSELF?: NO
6. HAVE YOU EVER DONE ANYTHING, STARTED TO DO ANYTHING, OR PREPARED TO DO ANYTHING TO END YOUR LIFE?: NO

## 2025-09-03 ASSESSMENT — PAIN SCALES - GENERAL: PAINLEVEL_OUTOF10: 0-NO PAIN

## 2025-09-03 ASSESSMENT — PATIENT HEALTH QUESTIONNAIRE - PHQ9
SUM OF ALL RESPONSES TO PHQ9 QUESTIONS 1 AND 2: 0
2. FEELING DOWN, DEPRESSED OR HOPELESS: NOT AT ALL
1. LITTLE INTEREST OR PLEASURE IN DOING THINGS: NOT AT ALL

## 2025-09-04 ENCOUNTER — PATIENT OUTREACH (OUTPATIENT)
Dept: HEMATOLOGY/ONCOLOGY | Facility: CLINIC | Age: 65
End: 2025-09-04
Payer: COMMERCIAL

## 2025-09-23 ENCOUNTER — APPOINTMENT (OUTPATIENT)
Dept: PRIMARY CARE | Facility: CLINIC | Age: 65
End: 2025-09-23
Payer: COMMERCIAL

## 2025-09-30 ENCOUNTER — APPOINTMENT (OUTPATIENT)
Dept: HEMATOLOGY/ONCOLOGY | Facility: HOSPITAL | Age: 65
End: 2025-09-30
Payer: COMMERCIAL